# Patient Record
Sex: MALE | Race: WHITE | URBAN - METROPOLITAN AREA
[De-identification: names, ages, dates, MRNs, and addresses within clinical notes are randomized per-mention and may not be internally consistent; named-entity substitution may affect disease eponyms.]

---

## 2017-03-15 ENCOUNTER — OFFICE VISIT (OUTPATIENT)
Dept: FAMILY MEDICINE | Facility: CLINIC | Age: 42
End: 2017-03-15
Payer: COMMERCIAL

## 2017-03-15 VITALS
SYSTOLIC BLOOD PRESSURE: 126 MMHG | OXYGEN SATURATION: 100 % | BODY MASS INDEX: 29.19 KG/M2 | HEIGHT: 72 IN | HEART RATE: 71 BPM | RESPIRATION RATE: 16 BRPM | DIASTOLIC BLOOD PRESSURE: 84 MMHG | TEMPERATURE: 97.5 F | WEIGHT: 215.5 LBS

## 2017-03-15 DIAGNOSIS — R10.12 LEFT UPPER QUADRANT PAIN: Primary | ICD-10-CM

## 2017-03-15 DIAGNOSIS — R19.5 DECREASED STOOL CALIBER: ICD-10-CM

## 2017-03-15 PROCEDURE — 99214 OFFICE O/P EST MOD 30 MIN: CPT | Performed by: FAMILY MEDICINE

## 2017-03-15 NOTE — NURSING NOTE
Chief Complaint   Patient presents with     digestion       Initial /90  Pulse 71  Temp 97.5  F (36.4  C) (Tympanic)  Resp 16  Ht 6' (1.829 m)  Wt 215 lb 8 oz (97.8 kg)  SpO2 100%  BMI 29.23 kg/m2 Estimated body mass index is 29.23 kg/(m^2) as calculated from the following:    Height as of this encounter: 6' (1.829 m).    Weight as of this encounter: 215 lb 8 oz (97.8 kg).  Medication Reconciliation: complete   .Maxi HERNANDEZ

## 2017-03-15 NOTE — PROGRESS NOTES
SUBJECTIVE:                                                    Sabas Broussard is a 41 year old male who presents to clinic today for the following health issues:      digestion      Duration: 2016    Description (location/character/radiation): left  Under rib area, colon    Intensity:  moderate    Accompanying signs and symptoms: none    History (similar episodes/previous evaluation): None    Precipitating or alleviating factors: None    Therapies tried and outcome: None     Started in September.  Got weird pain in back.  Migrated to side where kidney was.  Got bad, came in.  Saw someone, and at the same time was feeling constipated.  At the time there were all sorts of issues going on at same time, and wasn't sure what to make of it.      Also had gallbladder issues a few years back pain was never where it was supposed to be.    Did xrays in September; pretty big kidney stone.    Since then has dull pain in left upper quadrant.  Not now, but comes and goes.  Always in LUQ.    Last part, when goes to the bathroom not necessarily constipated but since then it's like smaller and skinnier.      No blood in stool.  No blood when wipes.  No family history of colon cancer.    Has had a lot of stress in life - wife left in December and dad  in February.    Problem list and histories reviewed & adjusted, as indicated.  Additional history: as documented    Reviewed and updated as needed this visit by clinical staff  Tobacco  Allergies  Med Hx  Surg Hx  Fam Hx  Soc Hx      Reviewed and updated as needed this visit by Provider         ROS:  Constitutional, HEENT, cardiovascular, pulmonary, gi and gu systems are negative, except as otherwise noted.    OBJECTIVE:                                                    /84  Pulse 71  Temp 97.5  F (36.4  C) (Tympanic)  Resp 16  Ht 6' (1.829 m)  Wt 215 lb 8 oz (97.8 kg)  SpO2 100%  BMI 29.23 kg/m2  Body mass index is 29.23 kg/(m^2).  GENERAL: healthy, alert  and no distress  RESP: lungs clear to auscultation - no rales, rhonchi or wheezes  CV: regular rate and rhythm, normal S1 S2, no S3 or S4, no murmur, click or rub, no peripheral edema and peripheral pulses strong  ABDOMEN: soft, nontender, without hepatosplenomegaly or masses, other than tenderness LUQ mild and bowel sounds normal  MS: no gross musculoskeletal defects noted, no edema  SKIN: no suspicious lesions or rashes  NEURO: Normal strength and tone, mentation intact and speech normal  PSYCH: mentation appears normal, affect normal/bright     ASSESSMENT/PLAN:                                                      Sabas was seen today for digestion.    Diagnoses and all orders for this visit:    Left upper quadrant pain  -     CT Abdomen w/o Contrast; Future  -     GASTROENTEROLOGY ADULT REF PROCEDURE ONLY    Decreased stool caliber  -     CT Abdomen w/o Contrast; Future  -     GASTROENTEROLOGY ADULT REF PROCEDURE ONLY        Patient Instructions   I'm glad you came in.    1) For your stomach pain: I think we need to look at 2 things:   --Let's have you get a CT scan to take a closer look at that left upper quadrant.  Call to schedule this:   For [facility] call [phone].  For [place] or [facility] call [phone].     --I'd like you to get a colonoscopy - they will call to schedule this for you.    2)  Consider Dad's separation support group - google this.          Rayne Sosa MD  [facility]

## 2017-03-15 NOTE — PATIENT INSTRUCTIONS
I'm glad you came in.    1) For your stomach pain: I think we need to look at 2 things:   --Let's have you get a CT scan to take a closer look at that left upper quadrant.  Call to schedule this:   For [facility] call [phone].  For [place] or [facility] call [phone].     --I'd like you to get a colonoscopy - they will call to schedule this for you.    2)  Consider Dad's separation support group - google this.

## 2017-03-15 NOTE — MR AVS SNAPSHOT
After Visit Summary   3/15/2017    Sabas Broussard    MRN: [id number]           Patient Information     Date Of Birth          1975        Visit Information        Provider Department      3/15/2017 4:00 PM Rayne Sosa MD [facility]        Today's Diagnoses     Left upper quadrant pain    -  1    Decreased stool caliber          Care Instructions    I'm glad you came in.    1) For your stomach pain: I think we need to look at 2 things:   --Let's have you get a CT scan to take a closer look at that left upper quadrant.  Call to schedule this:   For [facility] call [phone].  For [place] or [facility] call [phone].     --I'd like you to get a colonoscopy - they will call to schedule this for you.    2)  Consider Dad's separation support group - google this.            Follow-ups after your visit        Additional Services     GASTROENTEROLOGY ADULT REF PROCEDURE ONLY       Last Lab Result: Creatinine (mg/dL)       Date                     Value                 03/25/2016               1.10             ----------  Body mass index is 29.23 kg/(m^2).      Patient will be contacted to schedule procedure.     Please be aware that coverage of these services is subject to the terms and limitations of your health insurance plan.  Call member services at your health plan with any benefit or coverage questions.  Any procedures must be performed at a [place] facility OR coordinated by your clinic's referral office.    Please bring the following with you to your appointment:    (1) Any X-Rays, CTs or MRIs which have been performed.  Contact the facility where they were done to arrange for  prior to your scheduled appointment.    (2) List of current medications   (3) This referral request   (4) Any documents/labs given to you for this referral                  Future tests that were ordered for you today     Open Future Orders        Priority  Expected Expires Ordered    CT Abdomen w/o Contrast Routine  3/15/2018 3/15/2017            Who to contact     If you have questions or need follow up information about today's clinic visit or your schedule please contact [facility] directly at [phone].  Normal or non-critical lab and imaging results will be communicated to you by Madvenuehart, letter or phone within 4 business days after the clinic has received the results. If you do not hear from us within 7 days, please contact the clinic through Madvenuehart or phone. If you have a critical or abnormal lab result, we will notify you by phone as soon as possible.  Submit refill requests through [employer] or call your pharmacy and they will forward the refill request to us. Please allow 3 business days for your refill to be completed.          Additional Information About Your Visit        MyChart Information     [employer] gives you secure access to your electronic health record. If you see a primary care provider, you can also send messages to your care team and make appointments. If you have questions, please call your primary care clinic.  If you do not have a primary care provider, please call [phone] and they will assist you.        Care EveryWhere ID     This is your Care EveryWhere ID. This could be used by other organizations to access your [place] medical records  [id number]        Your Vitals Were     Pulse Temperature Respirations Height Pulse Oximetry BMI (Body Mass Index)    71 97.5  F (36.4  C) (Tympanic) 16 6' (1.829 m) 100% 29.23 kg/m2       Blood Pressure from Last 3 Encounters:   03/15/17 128/90   09/21/16 110/84   03/25/16 128/88    Weight from Last 3 Encounters:   03/15/17 215 lb 8 oz (97.8 kg)   09/21/16 212 lb 6.4 oz (96.3 kg)   03/25/16 212 lb 6.4 oz (96.3 kg)              We Performed the Following     GASTROENTEROLOGY ADULT REF PROCEDURE ONLY          Today's Medication Changes          These changes are  accurate as of: 3/15/17  4:40 PM.  If you have any questions, ask your nurse or doctor.               Stop taking these medicines if you haven't already. Please contact your care team if you have questions.     tamsulosin 0.4 MG capsule   Commonly known as:  FLOMAX   Stopped by:  Rayne Sosa MD                    Primary Care Provider Office Phone # Fax #    Rayne Sosa [phone]       [address] [address]        Thank you!     Thank you for choosing [facility]  for your care. Our goal is always to provide you with excellent care. Hearing back from our patients is one way we can continue to improve our services. Please take a few minutes to complete the written survey that you may receive in the mail after your visit with us. Thank you!             Your Updated Medication List - Protect others around you: Learn how to safely use, store and throw away your medicines at www.disposemymeds.org.      Notice  As of 3/15/2017  4:40 PM    You have not been prescribed any medications.

## 2017-07-11 ENCOUNTER — OFFICE VISIT (OUTPATIENT)
Dept: URGENT CARE | Facility: URGENT CARE | Age: 42
End: 2017-07-11
Payer: COMMERCIAL

## 2017-07-11 VITALS
SYSTOLIC BLOOD PRESSURE: 122 MMHG | BODY MASS INDEX: 29.04 KG/M2 | WEIGHT: 214.1 LBS | OXYGEN SATURATION: 99 % | TEMPERATURE: 98.5 F | DIASTOLIC BLOOD PRESSURE: 90 MMHG | HEART RATE: 72 BPM

## 2017-07-11 DIAGNOSIS — L29.9 ITCHING: ICD-10-CM
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
## 2017-07-11 PROCEDURE — 99213 OFFICE O/P EST LOW 20 MIN: CPT | Performed by: PHYSICIAN ASSISTANT

## 2017-07-11 RX ORDER — CETIRIZINE HYDROCHLORIDE 10 MG/1
10 TABLET ORAL EVERY EVENING
Qty: 30 TABLET | Refills: 1 | Status: SHIPPED | OUTPATIENT
Start: 2017-07-11 | End: 2021-10-13

## 2017-07-11 RX ORDER — TRIAMCINOLONE ACETONIDE 5 MG/G
CREAM TOPICAL
Qty: 30 G | Refills: 1 | Status: SHIPPED | OUTPATIENT
Start: 2017-07-11 | End: 2021-10-13

## 2017-07-11 NOTE — PROGRESS NOTES
SUBJECTIVE:  Sabas Broussard is a 42 year old male who presents to the clinic today for a rash.  Onset of rash was few days  ago.   Rash is still present.  Location of the rash: generalized.  Quality/symptoms of rash: itching and rash   Symptoms are mild and rash seems to be stable.  Previous history of a similar rash? none  Recent exposure history: possible bites  Recent new medications: none  Associated symptoms include: itching and rash.    Past Medical History:   Diagnosis Date     Cholecystitis 2011     Concussion age 17     Fracture, clavicle age 17     House dust mite allergy     and mold allergy        Allergies   Allergen Reactions     Seasonal Allergies      Social History   Substance Use Topics     Smoking status: Never Smoker     Smokeless tobacco: Never Used     Alcohol use 0.0 oz/week     0 Standard drinks or equivalent per week      Comment: socially       ROS:  CONSTITUTIONAL:NEGATIVE for fever, chills, change in weight  INTEGUMENTARY/SKIN: POSITIVE for rash, itching  ENT/MOUTH: NEGATIVE for ear, mouth and throat problems  RESP:NEGATIVE for significant cough or SOB  CV: NEGATIVE for chest pain, palpitations or peripheral edema  GI: NEGATIVE for nausea, abdominal pain, heartburn, or change in bowel habits  MUSCULOSKELETAL: NEGATIVE for significant arthralgias or myalgia  NEURO: NEGATIVE for weakness, dizziness or paresthesias    EXAM:   /90  Pulse 72  Temp 98.5  F (36.9  C)  Wt 214 lb 1.6 oz (97.1 kg)  SpO2 99%  BMI 29.04 kg/m2  GENERAL: alert, no acute distress.  SKIN: Rash description:    Distribution: generalized  Location: generalized    Color: itching,  Lesion type: maculopapular, isolated with inflammation  RESP: lungs clear to auscultation - no rales, rhonchi or wheezes  CV: regular rates and rhythm, normal S1 S2, no murmur noted  Extremities: no peripheral edema or tenderness, peripheral pulses normal  MS:  extremities normal- no gross deformities noted, no erythema, FROM noted in  all extremities  NEURO: Normal strength and tone, sensory exam grossly normal,  normal speech and mentation    ASSESSMENT/PLAN:      ICD-10-CM    1. Bug bites, initial encounter W57.XXXA triamcinolone (KENALOG) 0.5 % cream   2. Itching L29.9 cetirizine (ZYRTEC) 10 MG tablet     triamcinolone (KENALOG) 0.5 % cream     1) See today's orders.  2) Follow-up with primary clinic if not improving

## 2017-07-11 NOTE — NURSING NOTE
Chief Complaint   Patient presents with     Derm Problem     Pt c/o an itchy,spotty rash with onset this morning. Pt was camping yesterday. Pt's children have same rash.     Urgent Care       Initial /90  Pulse 72  Temp 98.5  F (36.9  C)  Wt 214 lb 1.6 oz (97.1 kg)  SpO2 99%  BMI 29.04 kg/m2 Estimated body mass index is 29.04 kg/(m^2) as calculated from the following:    Height as of 3/15/17: 6' (1.829 m).    Weight as of this encounter: 214 lb 1.6 oz (97.1 kg).  Medication Reconciliation: complete

## 2017-07-11 NOTE — MR AVS SNAPSHOT
After Visit Summary   7/11/2017    Sabas Broussard    MRN: [id number]           Patient Information     Date Of Birth          1975        Visit Information        Provider Department      7/11/2017 12:35 PM Jay Disla PA-C [facility]        Today's Diagnoses     Bug bites, initial encounter    -  1    Itching           Follow-ups after your visit        Who to contact     If you have questions or need follow up information about today's clinic visit or your schedule please contact [facility] directly at [phone].  Normal or non-critical lab and imaging results will be communicated to you by MyLifehart, letter or phone within 4 business days after the clinic has received the results. If you do not hear from us within 7 days, please contact the clinic through MyLifehart or phone. If you have a critical or abnormal lab result, we will notify you by phone as soon as possible.  Submit refill requests through [employer] or call your pharmacy and they will forward the refill request to us. Please allow 3 business days for your refill to be completed.          Additional Information About Your Visit        MyChart Information     [employer] gives you secure access to your electronic health record. If you see a primary care provider, you can also send messages to your care team and make appointments. If you have questions, please call your primary care clinic.  If you do not have a primary care provider, please call [phone] and they will assist you.        Care EveryWhere ID     This is your Care EveryWhere ID. This could be used by other organizations to access your [place] medical records  [id number]        Your Vitals Were     Pulse Temperature Pulse Oximetry BMI (Body Mass Index)          72 98.5  F (36.9  C) 99% 29.04 kg/m2         Blood Pressure from Last 3 Encounters:   07/11/17 122/90   03/15/17 126/84   09/21/16 110/84    Weight from  Last 3 Encounters:   07/11/17 214 lb 1.6 oz (97.1 kg)   03/15/17 215 lb 8 oz (97.8 kg)   09/21/16 212 lb 6.4 oz (96.3 kg)              Today, you had the following     No orders found for display         Today's Medication Changes          These changes are accurate as of: 7/11/17  1:11 PM.  If you have any questions, ask your nurse or doctor.               Start taking these medicines.        Dose/Directions    cetirizine 10 MG tablet   Commonly known as:  zyrTEC   Used for:  Itching   Started by:  Jay Disla PA-C        Dose:  10 mg   Take 1 tablet (10 mg) by mouth every evening   Quantity:  30 tablet   Refills:  1       triamcinolone 0.5 % cream   Commonly known as:  KENALOG   Used for:  Itching, Bug bites, initial encounter   Started by:  Jay Disla PA-C        Apply sparingly to affected area three times daily.   Quantity:  30 g   Refills:  1            Where to get your medicines      These medications were sent to [facility] [address] [place] [address] [address] [facility] [address] [facility] [address]     Phone:  [phone]     cetirizine 10 MG tablet    triamcinolone 0.5 % cream                Primary Care Provider Office Phone # Fax #    Rayne Joy Sosa [phone]       [address] [address]        Equal Access to Services     KEESHA DAILY AH: Hadii anthony ku hadasho Soomaali, waaxda luqadaha, qaybta kaalmada adeegyada, thang lofton. So [facility] [phone].    ATENCIÓN: Si habla teressa, tiene a beaver disposición servicios gratuitos de asistencia lingüística. Rachel julien [phone].    We comply with applicable federal civil rights laws and [place] laws. We do not discriminate on the basis of race, color, national origin, age, disability sex, sexual orientation or gender identity.            Thank you!     Thank you for choosing [facility]  for your care. Our goal is always to provide you with excellent care. Hearing back from our patients is one way we can continue to improve our services. Please take a few minutes to complete the written survey that you may receive in the mail after your visit with us. Thank you!             Your Updated Medication List - Protect others around you: Learn how to safely use, store and throw away your medicines at www.disposemymeds.org.          This list is accurate as of: 7/11/17  1:11 PM.  Always use your most recent med list.                   Brand Name Dispense Instructions for use Diagnosis    cetirizine 10 MG tablet    zyrTEC    30 tablet    Take 1 tablet (10 mg) by mouth every evening    Itching       triamcinolone 0.5 % cream    KENALOG    30 g    Apply sparingly to affected area three times daily.    Itching, Bug bites, initial encounter

## 2017-10-05 ENCOUNTER — TRANSFERRED RECORDS (OUTPATIENT)
Dept: HEALTH INFORMATION MANAGEMENT | Facility: CLINIC | Age: 42
End: 2017-10-05

## 2018-03-09 ENCOUNTER — OFFICE VISIT (OUTPATIENT)
Dept: FAMILY MEDICINE | Facility: CLINIC | Age: 43
End: 2018-03-09
Payer: COMMERCIAL

## 2018-03-09 VITALS
DIASTOLIC BLOOD PRESSURE: 72 MMHG | TEMPERATURE: 97.6 F | RESPIRATION RATE: 16 BRPM | BODY MASS INDEX: 29.29 KG/M2 | OXYGEN SATURATION: 98 % | SYSTOLIC BLOOD PRESSURE: 126 MMHG | HEART RATE: 66 BPM | WEIGHT: 216 LBS

## 2018-03-09 DIAGNOSIS — R19.5 DECREASED STOOL CALIBER: Primary | ICD-10-CM

## 2018-03-09 PROCEDURE — 99213 OFFICE O/P EST LOW 20 MIN: CPT | Performed by: FAMILY MEDICINE

## 2018-03-09 NOTE — NURSING NOTE
Chief Complaint   Patient presents with     Orders     ct and gastro       Initial /90  Pulse 66  Temp 97.6  F (36.4  C) (Tympanic)  Resp 16  Wt 216 lb (98 kg)  SpO2 98%  BMI 29.29 kg/m2 Estimated body mass index is 29.29 kg/(m^2) as calculated from the following:    Height as of 3/15/17: 6' (1.829 m).    Weight as of this encounter: 216 lb (98 kg).  Medication Reconciliation: complete   .Maxi HERNANDEZ

## 2018-03-09 NOTE — MR AVS SNAPSHOT
After Visit Summary   3/9/2018    Sabas Broussard    MRN: [id number]           Patient Information     Date Of Birth          1975        Visit Information        Provider Department      3/9/2018 10:20 AM Rayne Sosa MD [facility]        Today's Diagnoses     Decreased stool caliber    -  1       Follow-ups after your visit        Additional Services     GASTROENTEROLOGY ADULT REF PROCEDURE ONLY [facility] [phone] (Dr. Pratt)                 Who to contact     If you have questions or need follow up information about today's clinic visit or your schedule please contact [facility] directly at [phone].  Normal or non-critical lab and imaging results will be communicated to you by MyChart, letter or phone within 4 business days after the clinic has received the results. If you do not hear from us within 7 days, please contact the clinic through [employer] or phone. If you have a critical or abnormal lab result, we will notify you by phone as soon as possible.  Submit refill requests through "[employer] or call your pharmacy and they will forward the refill request to us. Please allow 3 business days for your refill to be completed.          Additional Information About Your Visit        MyChart Information     "[employer] gives you secure access to your electronic health record. If you see a primary care provider, you can also send messages to your care team and make appointments. If you have questions, please call your primary care clinic.  If you do not have a primary care provider, please call [phone] and they will assist you.        Care EveryWhere ID     This is your Care EveryWhere ID. This could be used by other organizations to access your [place] medical records  [id number]        Your Vitals Were     Pulse Temperature Respirations Pulse Oximetry BMI (Body Mass Index)       66 97.6  F (36.4  C)  (Tympanic) 16 98% 29.29 kg/m2        Blood Pressure from Last 3 Encounters:   03/09/18 122/90   07/11/17 122/90   03/15/17 126/84    Weight from Last 3 Encounters:   03/09/18 216 lb (98 kg)   07/11/17 214 lb 1.6 oz (97.1 kg)   03/15/17 215 lb 8 oz (97.8 kg)              We Performed the Following     GASTROENTEROLOGY ADULT REF PROCEDURE ONLY [facility] [phone] (Dr. Pratt)        Primary Care Provider Office Phone # Fax #    Rayne Sosa [phone] [address] [facility] [address]        Equal Access to Services     KEESHA DAILY : Hadii anthony tuckero Sogurinder, waaxda luqadaha, qaybta kaalmada adeegyada, thang lofton. So [facility] [phone].    ATENCIÓN: Si habla español, tiene a beaver disposición servicios gratuitos de asistencia lingüística. Llame al [phone].    We comply with applicable federal civil rights laws and [place] laws. We do not discriminate on the basis of race, color, national origin, age, disability, sex, sexual orientation, or gender identity.            Thank you!     Thank you for choosing [facility]  for your care. Our goal is always to provide you with excellent care. Hearing back from our patients is one way we can continue to improve our services. Please take a few minutes to complete the written survey that you may receive in the mail after your visit with us. Thank you!             Your Updated Medication List - Protect others around you: Learn how to safely use, store and throw away your medicines at www.disposemymeds.org.          This list is accurate as of 3/9/18 10:49 AM.  Always use your most recent med list.                   Brand Name Dispense Instructions for use Diagnosis    cetirizine 10 MG tablet    zyrTEC    30 tablet    Take 1 tablet (10 mg) by mouth every evening    Itching       triamcinolone 0.5 % cream    KENALOG    30 g    Apply sparingly to affected area  three times daily.    Itching, Bug bites, initial encounter

## 2018-03-09 NOTE — PROGRESS NOTES
"  SUBJECTIVE:   Sabas Broussard is a 42 year old male who presents to clinic today for the following health issues:      Pt is here to update his referrals from last year ct and gastro never completed.      digestion      Duration: September 2016    Description (location/character/radiation): left  Under rib area, colon    Intensity:  moderate    Accompanying signs and symptoms: none    History (similar episodes/previous evaluation): None    Precipitating or alleviating factors: None    Therapies tried and outcome: None       42 year old male who has had some digestion changes since 9/2016, who I saw last year for this but never followed through with getting tests.  Since that time the pain in his stomach is gone, but he still notes significantly decreased caliber of stool.  He works with cancer (works for [employer], so feels that \"he knows it can happen\".    No pain, but still decreased caliber..      No blood in stool.  No blood when wipes.  No family history of colon cancer.    Problem list and histories reviewed & adjusted, as indicated.  Additional history: as documented    Reviewed and updated as needed this visit by clinical staff  Tobacco  Allergies  Meds  Med Hx  Surg Hx  Fam Hx  Soc Hx      Reviewed and updated as needed this visit by Provider         ROS:  Constitutional, HEENT, cardiovascular, pulmonary, gi and gu systems are negative, except as otherwise noted.    OBJECTIVE:                                                    /90  Pulse 66  Temp 97.6  F (36.4  C) (Tympanic)  Resp 16  Wt 216 lb (98 kg)  SpO2 98%  BMI 29.29 kg/m2  Body mass index is 29.29 kg/(m^2).  GENERAL: healthy, alert and no distress  RESP: lungs clear to auscultation - no rales, rhonchi or wheezes  CV: regular rate and rhythm, normal S1 S2, no S3 or S4, no murmur, click or rub, no peripheral edema and peripheral pulses strong  ABDOMEN: soft, nontender, without hepatosplenomegaly or masses, other than " tenderness LUQ mild and bowel sounds normal  MS: no gross musculoskeletal defects noted, no edema  SKIN: no suspicious lesions or rashes  NEURO: Normal strength and tone, mentation intact and speech normal  PSYCH: mentation appears normal, affect normal/bright     ASSESSMENT/PLAN:                                                      Sabas was seen today for orders.    Diagnoses and all orders for this visit:    Decreased stool caliber  -     GASTROENTEROLOGY ADULT REF PROCEDURE ONLY [facility] [phone] (Dr. Pratt)    --As pain resolved I don't think needs CT scan.  --However, with persistently significantly reduced stool caliber does need colonoscopy; patient agrees, will call to schedule this himself this time instead of waiting for call.  --Glad things are better with stress level and home, boys.  Let us know if we can help or other symptoms or concerns.    Rayne Sosa MD  [facility]

## 2019-09-30 ENCOUNTER — TELEPHONE (OUTPATIENT)
Dept: FAMILY MEDICINE | Facility: CLINIC | Age: 44
End: 2019-09-30

## 2019-09-30 DIAGNOSIS — R19.5 DECREASED STOOL CALIBER: Primary | ICD-10-CM

## 2019-09-30 NOTE — TELEPHONE ENCOUNTER
Requesting referral from PCP.     Last OV: 3/9/2018 when referral was first signed.     Can this order be update for this patient so he is able to use or does he need an OV to assess further at this time.

## 2019-09-30 NOTE — TELEPHONE ENCOUNTER
Reason for Call: Request for an order or referral:    Order or referral being requested: Referral for GI    Date needed: as soon as possible    Has the patient been seen by the PCP for this problem? YES    Additional comments: Patient saw Dr Sosa and got a referral for GI. Patient states he waited too long and now it is . Patient would like another referral for GI. Please call if any questions and also to let patient know when referral is sent    Phone number Patient can be reached at:  Cell number on file:    Telephone Information:   Mobile [phone]       Best Time:  any    Can we leave a detailed message on this number?  YES    Call taken on 2019 at 2:21 PM by AMARILYS FRAUSTO

## 2019-10-01 NOTE — TELEPHONE ENCOUNTER
Patient Contact    Attempt # 1    Was call answered?  No.  Left message on voicemail with information to call me back.    GI Referral placed by PCP:   [facility] [phone] Dr. Pratt

## 2019-10-01 NOTE — TELEPHONE ENCOUNTER
Done.  Can you let patient know? Thanks.  Dr. Rayne Sosa MD/[facility]

## 2019-10-03 NOTE — TELEPHONE ENCOUNTER
Left a detailed voice message informing pt PCP has placed a referral for him. Referral information will be sent via my chart.

## 2019-10-09 ENCOUNTER — TELEPHONE (OUTPATIENT)
Dept: GASTROENTEROLOGY | Facility: CLINIC | Age: 44
End: 2019-10-09

## 2019-10-09 DIAGNOSIS — Z12.11 SPECIAL SCREENING FOR MALIGNANT NEOPLASMS, COLON: Primary | ICD-10-CM

## 2019-10-09 RX ORDER — PEG-3350, SODIUM SULFATE, SODIUM CHLORIDE, POTASSIUM CHLORIDE, SODIUM ASCORBATE AND ASCORBIC ACID 7.5-2.691G
1 KIT ORAL ONCE
Qty: 1 EACH | Refills: 0 | Status: SHIPPED | OUTPATIENT
Start: 2019-10-09 | End: 2019-10-09

## 2019-10-09 NOTE — TELEPHONE ENCOUNTER
Patient Name: Sabas Broussard   : 1975  MRN: [id number]       : [x] N/A           Additional Information regarding appointment:      Patient scheduled for:   [x] Colonoscopy     Indication for procedure.   [x] Decreased stool caliber [R19.5]  - Primary    Sedation Type: [x] Conscious Sedation       Procedure Provider:  Dr. Castro      Referring Provider. Rayne Sosa    Arrival time verified: 12:15 pm / Tues / 10/15/19    Facility location verified:       [x][address], 5th floor       Prep Type:   [x]Golytely eRx:     Anticoagulants or blood thinners: [x]None     Electronic implanted devices: [x] No      H&P / Pre op physical completed: [x] N/A,     Additional Information:      _______________________________________________      Instructions given: [] Rec'd & Read   [] Reviewed       [] Resent via [facility] - This includes:    instructions, Conscious Sedation policy /  MAC Instructions, procedure date/time/location/provider.       [] Resent via eMail - eMail address confirmed: Pt requests resend endoscopy appointment information communication via unencrypted e-mail. This includes    instructions,   Map, Conscious Sedation / MAC policy, procedure date/time/location/provider.  Pt acknowledges that they have agreed to accept the risks and receive unencrypted communications for this incidence.        Pre procedure teaching completed: [] Yes - Reviewed, [] No,      [] No questions regarding Sedation as ordered, []      Transportation from procedure & responsible adult to be with patient following procedure for a minimum of 6 hrs (Conscious Sedation) 24 hrs (MAC): [] Yes   - confirmed will have post-procedure companionship as required, [] Pending  , [] No         Digna Peña RN, [facility] End[facility]

## 2019-10-15 ENCOUNTER — HOSPITAL ENCOUNTER (OUTPATIENT)
Facility: AMBULATORY SURGERY CENTER | Age: 44
End: 2019-10-15
Attending: INTERNAL MEDICINE
Payer: COMMERCIAL

## 2019-10-15 VITALS
BODY MASS INDEX: 28.44 KG/M2 | TEMPERATURE: 97.9 F | HEART RATE: 66 BPM | OXYGEN SATURATION: 97 % | RESPIRATION RATE: 18 BRPM | SYSTOLIC BLOOD PRESSURE: 134 MMHG | DIASTOLIC BLOOD PRESSURE: 101 MMHG | WEIGHT: 210 LBS | HEIGHT: 72 IN

## 2019-10-15 LAB — COLONOSCOPY: NORMAL

## 2019-10-15 RX ORDER — ONDANSETRON 2 MG/ML
4 INJECTION INTRAMUSCULAR; INTRAVENOUS
Status: DISCONTINUED | OUTPATIENT
Start: 2019-10-15 | End: 2019-10-16 | Stop reason: HOSPADM

## 2019-10-15 RX ORDER — LIDOCAINE 40 MG/G
CREAM TOPICAL
Status: DISCONTINUED | OUTPATIENT
Start: 2019-10-15 | End: 2019-10-16 | Stop reason: HOSPADM

## 2019-10-15 RX ORDER — SIMETHICONE
LIQUID (ML) MISCELLANEOUS PRN
Status: DISCONTINUED | OUTPATIENT
Start: 2019-10-15 | End: 2019-10-15 | Stop reason: HOSPADM

## 2019-10-15 ASSESSMENT — MIFFLIN-ST. JEOR: SCORE: 1885.55

## 2020-03-01 ENCOUNTER — HEALTH MAINTENANCE LETTER (OUTPATIENT)
Age: 45
End: 2020-03-01

## 2020-06-16 ENCOUNTER — VIRTUAL VISIT (OUTPATIENT)
Dept: FAMILY MEDICINE | Facility: OTHER | Age: 45
End: 2020-06-16

## 2020-06-16 NOTE — PROGRESS NOTES
"Date: 2020 12:53:05  Clinician: Jai Jean  Clinician NPI: [id number]  Patient: Sabas Broussard  Patient : 1975  Patient Address: [address] [place] [address]  Patient Phone: [phone]  Visit Protocol: Eye conditions  Patient Summary:  Sabas is a 44 year old (: 1975 ) male who initiated a Visit for conjunctivitis.  When asked the question \"Please sign me up to receive news, health information and promotions. \", Sabas responded \"No\".    Images of his eye condition were uploaded.   His symptoms started 1-2 days ago and affect the left eye. The symptoms consist of eyelid swelling, itchy eye(s), and drainage coming from the eye(s).   Symptom details     Drainage: The color of the drainage coming out of his eye(s) is yellow. The drainage is thick but does not cause his eyelids to be stuck shut in the morning.    Itchiness: Sabas has seasonal allergies or hay fever.     Denied symptoms include light sensitivity, eye pain, bumps on the eyelid, and eye redness. Sabas does not have subconjunctival hemorrhage and has not experienced a decrease in vision. He does not feel feverish.   Precipitating events   He has not had a recent cold or ear infection, eye surgery, foreign body in the eye(s), and eye injury. He does not wear contact lenses.   Pertinent medical history  Sabas has not ever been diagnosed with glaucoma.   Sabas has been using Ketotifen ophthalmic solution to treat his current symptoms.   Medication efficacy as reported by the patient (free text): Alaway works well for seasonal allergies, which makes my eye puffy and irritated; this is different, my upper eyelid is red and swollen and the area above my eyelid is also swollen.  My eye is relatively white and clear.   Sabas does not require proof of evaluation of his eye condition before returning to school, work, or .   Sabas does not smoke or use smokeless tobacco.     MEDICATIONS: No current " medications, ALLERGIES: NKDA  Clinician Response:  Dear Sabas,  Based on the information provided, the bump on your eyelid is most likely a stye (hordeolum). A stye is a red, painful bump that forms when glands on the edge of the eyelid become infected or inflamed.  Medication information  I am prescribing:  Erythromycin 5 mg/gram (0.5%) ophthalmic (eye) ointment. Apply 1 cm ribbon into the affected eye(s) 4-6 times per day for 7 days. There are no refills with this prescription.  The medication I prescribed is an antibiotic medication. Infections can be caused by either bacteria or a virus, and often have similar symptoms, so it is possible that this is a viral infection. Antibiotics are only effective against bacterial infections, so when it is caused by a virus, the medication will not help symptoms improve or make it less contagious.  Self care  I recommend wetting a clean washcloth with warm water and gently placing it on the bump/swollen area of your eye for 10-15 minutes. Reheat the washcloth with warm water when it cools. Doing this 3-4 times a day will ease soreness and help the stye to drain. Do not squeeze or try to pop your stye because this could cause the infection to spread.  To reduce the spread of the eye infection, be sure to wash your hands at least once per hour and avoid touching the eyes as much as possible.  The following will reduce the risk for future eye infections:     Frequent handwashing    Replace towels and washcloths daily    Do not share towels and washcloths with others     Steps you can take to be as comfortable as possible:     Take regular breaks and remember to blink regularly when reading or using a computer for long periods of time    Wear sunglasses when outside    Wear eye protection when swimming or working with chemicals    Use good lighting     When to seek care  Please make an appointment to be seen in a clinic or urgent care if any of the following occurs:     You  develop new symptoms or your symptoms becomes worse.    Your symptoms do not improve within 2 days of starting treatment.      Diagnosis: Stye (hordeolum externum)  Diagnosis ICD: H00.019  Prescription: erythromycin 5 mg/gram (0.5 %) ophthalmic (eye) ointment 1 3.5 gram tube, 7 days supply. Apply 1 cm ribbon into the affected eye(s) 4-6 times per day for 7 days. Refills: 0, Refill as needed: no, Allow substitutions: yes  Pharmacy: [id number] - [phone] - [address] [place] [address]

## 2020-12-14 ENCOUNTER — HEALTH MAINTENANCE LETTER (OUTPATIENT)
Age: 45
End: 2020-12-14

## 2021-04-18 ENCOUNTER — HEALTH MAINTENANCE LETTER (OUTPATIENT)
Age: 46
End: 2021-04-18

## 2021-10-02 ENCOUNTER — HEALTH MAINTENANCE LETTER (OUTPATIENT)
Age: 46
End: 2021-10-02

## 2021-10-13 ENCOUNTER — OFFICE VISIT (OUTPATIENT)
Dept: FAMILY MEDICINE | Facility: CLINIC | Age: 46
End: 2021-10-13
Payer: COMMERCIAL

## 2021-10-13 VITALS
TEMPERATURE: 97.3 F | OXYGEN SATURATION: 98 % | DIASTOLIC BLOOD PRESSURE: 78 MMHG | SYSTOLIC BLOOD PRESSURE: 128 MMHG | HEART RATE: 71 BPM | BODY MASS INDEX: 27.94 KG/M2 | WEIGHT: 206.04 LBS

## 2021-10-13 DIAGNOSIS — Z13.220 SCREENING FOR HYPERLIPIDEMIA: ICD-10-CM

## 2021-10-13 DIAGNOSIS — Z00.00 ROUTINE GENERAL MEDICAL EXAMINATION AT A HEALTH CARE FACILITY: Primary | ICD-10-CM

## 2021-10-13 DIAGNOSIS — Z11.4 SCREENING FOR HIV (HUMAN IMMUNODEFICIENCY VIRUS): ICD-10-CM

## 2021-10-13 DIAGNOSIS — Z11.59 NEED FOR HEPATITIS C SCREENING TEST: ICD-10-CM

## 2021-10-13 LAB
CHOLEST SERPL-MCNC: 238 MG/DL
FASTING STATUS PATIENT QL REPORTED: YES
FASTING STATUS PATIENT QL REPORTED: YES
GLUCOSE BLD-MCNC: 73 MG/DL (ref 70–99)
HCV AB SERPL QL IA: NONREACTIVE
HDLC SERPL-MCNC: 49 MG/DL
HIV 1+2 AB+HIV1 P24 AG SERPL QL IA: NONREACTIVE
LDLC SERPL CALC-MCNC: 154 MG/DL
NONHDLC SERPL-MCNC: 189 MG/DL
TRIGL SERPL-MCNC: 173 MG/DL

## 2021-10-13 PROCEDURE — 87389 HIV-1 AG W/HIV-1&-2 AB AG IA: CPT | Performed by: FAMILY MEDICINE

## 2021-10-13 PROCEDURE — 82947 ASSAY GLUCOSE BLOOD QUANT: CPT | Performed by: FAMILY MEDICINE

## 2021-10-13 PROCEDURE — 80061 LIPID PANEL: CPT | Performed by: FAMILY MEDICINE

## 2021-10-13 PROCEDURE — 36415 COLL VENOUS BLD VENIPUNCTURE: CPT | Performed by: FAMILY MEDICINE

## 2021-10-13 PROCEDURE — 99386 PREV VISIT NEW AGE 40-64: CPT | Mod: 25 | Performed by: FAMILY MEDICINE

## 2021-10-13 PROCEDURE — 90471 IMMUNIZATION ADMIN: CPT | Performed by: FAMILY MEDICINE

## 2021-10-13 PROCEDURE — 82784 ASSAY IGA/IGD/IGG/IGM EACH: CPT | Performed by: FAMILY MEDICINE

## 2021-10-13 PROCEDURE — 86803 HEPATITIS C AB TEST: CPT | Performed by: FAMILY MEDICINE

## 2021-10-13 PROCEDURE — 90686 IIV4 VACC NO PRSV 0.5 ML IM: CPT | Performed by: FAMILY MEDICINE

## 2021-10-13 PROCEDURE — 83516 IMMUNOASSAY NONANTIBODY: CPT | Performed by: FAMILY MEDICINE

## 2021-10-13 RX ORDER — LORATADINE 10 MG/1
TABLET ORAL
COMMUNITY
Start: 2020-01-01

## 2021-10-13 ASSESSMENT — ENCOUNTER SYMPTOMS
FREQUENCY: 0
HEARTBURN: 0
PALPITATIONS: 0
CHILLS: 0
DIARRHEA: 0
NERVOUS/ANXIOUS: 0
MYALGIAS: 0
DIZZINESS: 0
ARTHRALGIAS: 0
EYE PAIN: 0
DYSURIA: 0
SORE THROAT: 0
NAUSEA: 0
HEADACHES: 0
COUGH: 0
CONSTIPATION: 0
PARESTHESIAS: 0
SHORTNESS OF BREATH: 0
HEMATURIA: 0
JOINT SWELLING: 0
WEAKNESS: 0
FEVER: 0
ABDOMINAL PAIN: 0
HEMATOCHEZIA: 0

## 2021-10-13 NOTE — PROGRESS NOTES
SUBJECTIVE:   CC: Sabas Broussard is an 46 year old male who presents for preventative health visit.     Patient has been advised of split billing requirements and indicates understanding: Yes  Healthy Habits:     Getting at least 3 servings of Calcium per day:  Yes    Bi-annual eye exam:  Yes    Dental care twice a year:  Yes    Sleep apnea or symptoms of sleep apnea:  None    Diet:  Regular (no restrictions)    Frequency of exercise:  4-5 days/week    Duration of exercise:  45-60 minutes    Taking medications regularly:  Yes    Medication side effects:  None    PHQ-2 Total Score: 0    Additional concerns today:  No    Gut issues.  Had colonoscopy - normal.  Poop different smaller caliber.  More constipated.  Normal colonoscopy 10/2019.  Son diagnosed with type 1 dm, and celiac?  Could that be it?        BP.  123/81.  Today       Today's PHQ-2 Score:   PHQ-2 ( 1999 Pfizer) 10/13/2021   Q1: Little interest or pleasure in doing things 0   Q2: Feeling down, depressed or hopeless 0   PHQ-2 Score 0   Q1: Little interest or pleasure in doing things Not at all   Q2: Feeling down, depressed or hopeless Not at all   PHQ-2 Score 0       Abuse: Current or Past(Physical, Sexual or Emotional)- No  Do you feel safe in your environment? Yes    Have you ever done Advance Care Planning? (For example, a Health Directive, POLST, or a discussion with a medical provider or your loved ones about your wishes): No, advance care planning information given to patient to review.  Patient plans to discuss their wishes with loved ones or provider.      Social History     Tobacco Use     Smoking status: Never Smoker     Smokeless tobacco: Never Used   Substance Use Topics     Alcohol use: Yes     Alcohol/week: 0.0 standard drinks     Comment: socially     If you drink alcohol do you typically have >3 drinks per day or >7 drinks per week? No    Alcohol Use 10/13/2021   Prescreen: >3 drinks/day or >7 drinks/week? No   Prescreen: >3 drinks/day or  >7 drinks/week? -   No flowsheet data found.    Last PSA: No results found for: PSA    Reviewed orders with patient. Reviewed health maintenance and updated orders accordingly - Yes      Reviewed and updated as needed this visit by clinical staff  Tobacco  Allergies  Meds  Problems             Reviewed and updated as needed this visit by Provider  Tobacco    Problems                Review of Systems   Constitutional: Negative for chills and fever.   HENT: Negative for congestion, ear pain, hearing loss and sore throat.    Eyes: Negative for pain and visual disturbance.   Respiratory: Negative for cough and shortness of breath.    Cardiovascular: Negative for chest pain, palpitations and peripheral edema.   Gastrointestinal: Negative for abdominal pain, constipation, diarrhea, heartburn, hematochezia and nausea.   Genitourinary: Negative for discharge, dysuria, frequency, genital sores, hematuria, impotence and urgency.   Musculoskeletal: Negative for arthralgias, joint swelling and myalgias.   Skin: Negative for rash.   Neurological: Negative for dizziness, weakness, headaches and paresthesias.   Psychiatric/Behavioral: Negative for mood changes. The patient is not nervous/anxious.          OBJECTIVE:   /78   Pulse 71   Temp 97.3  F (36.3  C) (Temporal)   Wt 93.5 kg (206 lb 0.6 oz)   SpO2 98%   BMI 27.94 kg/m      Physical Exam  GENERAL: healthy, alert and no distress  EYES: Eyes grossly normal to inspection, PERRL and conjunctivae and sclerae normal  HENT: ear canals and TM's normal, nose and mouth without ulcers or lesions  NECK: no adenopathy, no asymmetry, masses, or scars and thyroid normal to palpation  RESP: lungs clear to auscultation - no rales, rhonchi or wheezes  CV: regular rate and rhythm, normal S1 S2, no S3 or S4, no murmur, click or rub, no peripheral edema and peripheral pulses strong  ABDOMEN: soft, nontender, no hepatosplenomegaly, no masses and bowel sounds normal  MS: no gross  musculoskeletal defects noted, no edema  SKIN: no suspicious lesions or rashes  NEURO: Normal strength and tone, mentation intact and speech normal  PSYCH: mentation appears normal, affect normal/bright    Diagnostic Test Results:  Labs reviewed in Epic    ASSESSMENT/PLAN:   Sabas was seen today for physical.    Diagnoses and all orders for this visit:    Routine general medical examination at a health care facility  Comments:  BP good this year  Check lipids.  Check TTG to complete rule out celiac  Orders:  -     HIV Antigen Antibody Combo; Future  -     Lipid panel reflex to direct LDL Fasting; Future  -     GLUCOSE; Future  -     Tissue transglutaminase stephen IgA and IgG; Future  -     IgA; Future  -     Adult Urology Referral; Future  -     Adult Dermatology Referral; Future  -     HIV Antigen Antibody Combo  -     Lipid panel reflex to direct LDL Fasting  -     GLUCOSE  -     Tissue transglutaminase stephen IgA and IgG  -     IgA    Screening for HIV (human immunodeficiency virus)  Comments:  low risk, routine  ordered    Need for hepatitis C screening test  Comments:  low risk, routine  ordered  Orders:  -     Hepatitis C Screen Reflex to HCV RNA Quant and Genotype; Future  -     Hepatitis C Screen Reflex to HCV RNA Quant and Genotype    Screening for hyperlipidemia    Other orders  -     REVIEW OF HEALTH MAINTENANCE PROTOCOL ORDERS  -     INFLUENZA VACCINE IM >6 MO VALENT IIV4 (ALFURIA/FLUZONE)        Patient has been advised of split billing requirements and indicates understanding: Yes  COUNSELING:   Reviewed preventive health counseling, as reflected in patient instructions    Estimated body mass index is 27.94 kg/m  as calculated from the following:    Height as of 10/15/19: 1.829 m (6').    Weight as of this encounter: 93.5 kg (206 lb 0.6 oz).     Weight management plan: Discussed healthy diet and exercise guidelines    He reports that he has never smoked. He has never used smokeless tobacco.      Counseling  Resources:  ATP IV Guidelines  Pooled Cohorts Equation Calculator  FRAX Risk Assessment  ICSI Preventive Guidelines  Dietary Guidelines for Americans, 2010  USDA's MyPlate  ASA Prophylaxis  Lung CA Screening    Rayne Sosa MD  [facility]

## 2021-10-14 LAB
IGA SERPL-MCNC: 310 MG/DL (ref 84–499)
TTG IGA SER-ACNC: 0.4 U/ML
TTG IGG SER-ACNC: 0.8 U/ML

## 2021-10-18 PROBLEM — E78.2 MODERATE MIXED HYPERLIPIDEMIA NOT REQUIRING STATIN THERAPY: Status: ACTIVE | Noted: 2021-10-18

## 2021-10-18 NOTE — RESULT ENCOUNTER NOTE
Dillan Obregon,  It was nice to see you in clinic! Your results are back and show:  -No celiac antibodies making it very unlikely that you have celiac disease.  -Normal glucose and infectious disease testing.  -Your cholesterol is borderline high, but your overall heart disease risk score is okay.  Continue exercise and heart healthy Mediterranean-style diet rich in fish, olive oil, whole grains, vegetables and low in animal fats and processed foods to reduce your risk of heart disease.  Let me know if you have any questions.  Best,  Dr. Rayne Sosa MD/[facility]         The 10-year ASCVD risk score (Mei DAHL Jr., et al., 2013) is: 2.8%    Values used to calculate the score:      Age: 46 years      Sex: Male      Is Non- : No      Diabetic: No      Tobacco smoker: No      Systolic Blood Pressure: 128 mmHg      Is BP treated: No      HDL Cholesterol: 49 mg/dL      Total Cholesterol: 238 mg/dL

## 2022-02-28 ENCOUNTER — OFFICE VISIT (OUTPATIENT)
Dept: DERMATOLOGY | Facility: CLINIC | Age: 47
End: 2022-02-28
Attending: FAMILY MEDICINE
Payer: COMMERCIAL

## 2022-02-28 DIAGNOSIS — D18.01 CHERRY ANGIOMA: Primary | ICD-10-CM

## 2022-02-28 DIAGNOSIS — Z12.83 SKIN CANCER SCREENING: ICD-10-CM

## 2022-02-28 DIAGNOSIS — Z00.00 ROUTINE GENERAL MEDICAL EXAMINATION AT A HEALTH CARE FACILITY: ICD-10-CM

## 2022-02-28 DIAGNOSIS — L81.4 SOLAR LENTIGINOSIS: ICD-10-CM

## 2022-02-28 DIAGNOSIS — D22.9 MULTIPLE PIGMENTED NEVI: ICD-10-CM

## 2022-02-28 PROCEDURE — 99203 OFFICE O/P NEW LOW 30 MIN: CPT | Performed by: PHYSICIAN ASSISTANT

## 2022-02-28 ASSESSMENT — PAIN SCALES - GENERAL: PAINLEVEL: NO PAIN (0)

## 2022-02-28 NOTE — NURSING NOTE
Dermatology Rooming Note    Sabas Broussard's goals for this visit include:   Chief Complaint   Patient presents with     Skin Check     Sabas is here today for his first full body skin check, reports no concerns.     Evette Sidhu, EMT

## 2022-02-28 NOTE — PROGRESS NOTES
[facility] Dermatology Note  Encounter Date: Feb 28, 2022  {kkvisit3:242541}    Dermatology Problem List:  1. ***    ____________________________________________    Assessment & Plan:    # {Diagnosesderm:600790}.   {kkplans:082015}   - ***     # {Diagnosesderm:758113}.   {kkplans:977104}   - ***     Procedures Performed:   {kkprocedurenotes:761118}  {kkprocedurenotes:791253}    Follow-up: {kkfollowup:386234}    Staff and Scribe:     Scribe Disclosure:  IGabby, am serving as a scribe to document services personally performed by Dona Ugarte PA-C based on data collection and the provider's statements to me.     ***  ____________________________________________    CC: No chief complaint on file.    HPI:  Mr. Sabas Broussard is a(n) 46 year old male who presents today {kknew/return:059597} for ***    Patient is otherwise feeling well, without additional skin concerns.    Labs Reviewed:  ***N/A    Physical Exam:  Vitals: There were no vitals taken for this visit.  SKIN: {kkSkinExam:664765}  - ***  - {Skin Exam Derm:462554}.   - {Skin Exam Derm:513011}.   - {Skin Exam Derm:030150}.   - No other lesions of concern on areas examined.     Medications:  Current Outpatient Medications   Medication     loratadine (CLARITIN) 10 MG tablet     No current facility-administered medications for this visit.      Past Medical History:   Patient Active Problem List   Diagnosis     Cholecystitis     House dust mite allergy     Decreased stool caliber     Moderate mixed hyperlipidemia not requiring statin therapy     Past Medical History:   Diagnosis Date     Cholecystitis 2011     Concussion age 17     Fracture, clavicle age 17     House dust mite allergy     and mold allergy     Hypertension 2012    slightly higher than normal but not treated        CC Rayne Sosa MD  [address] [place] [address] on close of this encounter.

## 2022-02-28 NOTE — PROGRESS NOTES
[facility] Dermatology Note  Encounter Date: Feb 28, 2022  Office Visit     Dermatology Problem List:  1. Benign skin cancer screening 2/28/22    ____________________________________________    Assessment & Plan:     # Multiple benign nevi and solar lentigenes  - ABCDEs: Counseled ABCDEs of melanoma: Asymmetry, Border (irregularity), Color (not uniform, changes in color), Diameter (greater than 6 mm which is about the size of a pencil eraser), and Evolving (any changes in preexisting moles).  - Sun protection: Counseled SPF30+ sunscreen, UPF clothing, sun avoidance, tanning bed avoidance.       # Cherry angiomas.   - Benign, No further management at this time.    Procedures Performed:   None  None    Follow-up: 2 year(s) in-person, or earlier for new or changing lesions    Staff:     All risks, benefits and alternatives were discussed with patient.  Patient is in agreement and understands the assessment and plan.  All questions were answered.  Sun Screen Education was given.   Return to Clinic in 2-3 years or sooner as needed.   Dona Ugarte PA-C   ____________________________________________    CC: Skin Check (Sabas is here today for his first full body skin check, reports no concerns.)    HPI:  Mr. Sabas Broussard is a(n) 46 year old male who presents today as a new patient for a skin check. He was recommended a baseline skin check. He has spent a lot of time outdoors, in the sun. He does not always wear sunscreen or other form of sun protection. 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 [place] Denies any significant history of sunburns. No known family history of skin cancer.     Patient is otherwise feeling well, without additional skin concerns.     Labs Reviewed:  N/A    Physical Exam:  Vitals: There were no vitals taken for this visit.  SKIN: Full skin, which includes the head/face, both arms, chest, back,  abdomen,both legs, genitalia and/or groin buttocks, digits and/or nails, was examined.  Foy skin type II-III.   - There are dome shaped bright red papules on the trunk.   Scattered brown macules on sun exposed areas..   - Multiple regular brown pigmented macules and papules are identified on the trunk and extremities, less than 100. .   - No other lesions of concern on areas examined.     Medications:  Current Outpatient Medications   Medication     loratadine (CLARITIN) 10 MG tablet     No current facility-administered medications for this visit.      Past Medical History:   Patient Active Problem List   Diagnosis     Cholecystitis     House dust mite allergy     Decreased stool caliber     Moderate mixed hyperlipidemia not requiring statin therapy     Past Medical History:   Diagnosis Date     Cholecystitis 2011     Concussion age 17     Fracture, clavicle age 17     House dust mite allergy     and mold allergy     Hypertension 2012    slightly higher than normal but not treated       CC Rayne Sosa MD  [address] [place] [address] on close of this encounter.

## 2022-02-28 NOTE — LETTER
2/28/2022       RE: Sabas Broussard  [address]  [facility] [address]     Dear Colleague,    Thank you for referring your patient, Sabas Broussard, to the [facility] DERMATOLOGY CLINIC [place] at [facility]. Please see a copy of my visit note below.    [facility] Dermatology Note  Encounter Date: Feb 28, 2022  Office Visit     Dermatology Problem List:  1. Benign skin cancer screening 2/28/22    ____________________________________________    Assessment & Plan:     # Multiple benign nevi and solar lentigenes  - ABCDEs: Counseled ABCDEs of melanoma: Asymmetry, Border (irregularity), Color (not uniform, changes in color), Diameter (greater than 6 mm which is about the size of a pencil eraser), and Evolving (any changes in preexisting moles).  - Sun protection: Counseled SPF30+ sunscreen, UPF clothing, sun avoidance, tanning bed avoidance.       # Cherry angiomas.   - Benign, No further management at this time.    Procedures Performed:   None  None    Follow-up: 2 year(s) in-person, or earlier for new or changing lesions    Staff:     All risks, benefits and alternatives were discussed with patient.  Patient is in agreement and understands the assessment and plan.  All questions were answered.  Sun Screen Education was given.   Return to Clinic in 2-3 years or sooner as needed.   Dona Ugarte PA-C   ____________________________________________    CC: Skin Check (Sabas is here today for his first full body skin check, reports no concerns.)    HPI:  Mr. Sabas Broussard is a(n) 46 year old male who presents today as a new patient for a skin check. He was recommended a baseline skin check. He has spent a lot of time outdoors, in the sun. He does not always wear sunscreen or other form of sun protection. 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 [place]  any significant history of sunburns. No known family history of skin cancer.     Patient is otherwise feeling well, without additional skin concerns.     Labs Reviewed:  N/A    Physical Exam:  Vitals: There were no vitals taken for this visit.  SKIN: Full skin, which includes the head/face, both arms, chest, back, abdomen,both legs, genitalia and/or groin buttocks, digits and/or nails, was examined.  Foy skin type II-III.   - There are dome shaped bright red papules on the trunk.   Scattered brown macules on sun exposed areas..   - Multiple regular brown pigmented macules and papules are identified on the trunk and extremities, less than 100. .   - No other lesions of concern on areas examined.     Medications:  Current Outpatient Medications   Medication     loratadine (CLARITIN) 10 MG tablet     No current facility-administered medications for this visit.      Past Medical History:   Patient Active Problem List   Diagnosis     Cholecystitis     House dust mite allergy     Decreased stool caliber     Moderate mixed hyperlipidemia not requiring statin therapy     Past Medical History:   Diagnosis Date     Cholecystitis 2011     Concussion age 17     Fracture, clavicle age 17     House dust mite allergy     and mold allergy     Hypertension 2012    slightly higher than normal but not treated       CC Rayne Sosa MD  [address] [place] [address] on close of this encounter.

## 2022-02-28 NOTE — PATIENT INSTRUCTIONS
Patient Education     Checking for Skin Cancer  You can find cancer early by checking your skin each month. There are 3 kinds of skin cancer. They are melanoma, basal cell carcinoma, and squamous cell carcinoma. Doing monthly skin checks is the best way to find new marks or skin changes. Follow the instructions below for checking your skin.   The ABCDEs of checking moles for melanoma   Check your moles or growths for signs of melanoma using ABCDE:     Asymmetry: the sides of the mole or growth don t match    Border: the edges are ragged, notched, or blurred    Color: the color within the mole or growth varies    Diameter: the mole or growth is larger than 6 mm (size of a pencil eraser)    Evolving: the size, shape, or color of the mole or growth is changing (evolving is not shown in the images below)    Checking for other types of skin cancer  Basal cell carcinoma or squamous cell carcinoma have symptoms such as:       A spot or mole that looks different from all other marks on your skin    Changes in how an area feels, such as itching, tenderness, or pain    Changes in the skin's surface, such as oozing, bleeding, or scaliness    A sore that does not heal    New swelling or redness beyond the border of a mole    Who s at risk?  Anyone can get skin cancer. But you are at greater risk if you have:     Fair skin, light-colored hair, or light-colored eyes    Many moles or abnormal moles on your skin    A history of sunburns from sunlight or tanning beds    A family history of skin cancer    A history of exposure to radiation or chemicals    A weakened immune system  If you have had skin cancer in the past, you are at risk for recurring skin cancer.   How to check your skin  Do your monthly skin checkups in front of a full-length mirror. Check all parts of your body, including your:     Head (ears, face, neck, and scalp)    Torso (front, back, and sides)    Arms (tops, undersides, upper, and lower  armpits)    Hands (palms, backs, and fingers, including under the nails)    Buttocks and genitals    Legs (front, back, and sides)    Feet (tops, soles, toes, including under the nails, and between toes)  If you have a lot of moles, take digital photos of them each month. Make sure to take photos both up close and from a distance. These can help you see if any moles change over time.   Most skin changes are not cancer. But if you see any changes in your skin, call your doctor right away. Only he or she can diagnose a problem. If you have skin cancer, seeing your doctor can be the first step toward getting the treatment that could save your life.   [employer] last reviewed this educational content on 4/1/2019 2000-2020 [employer]. [address] [place] [address]. All rights reserved. This information is not intended as a substitute for professional medical care. Always follow your healthcare professional's instructions.       When should I call my doctor?    If you are worsening or not improving, please, contact us or seek urgent care as noted below.     Who should I call with questions (adults)?    [facility] (adult and pediatric): [phone]    [facility] (adult): [phone]    For urgent needs outside of business hours call the [facility] at [phone] and ask for the dermatology resident on call to be paged    If this is a medical emergency and you are unable to reach an ER, Call [phone]    Who should I call with questions (pediatric)?  [facility]- Pediatric Dermatology  Dr. Chandrika Frey, Dr. Eugenia Baez, Dr. Windy Agee, Dona Ugarte, PA, Dr. Thelma Rucker, Dr. Ita Keenan & Dr. Jean-Paul Chandra  Non-urgent nurse triage line; [phone]- Mary Jane and Linda BARLOW Care Coordinatorashly Graf (/Complex ) [phone]    If you need a prescription refill,  please contact your pharmacy. Refills are approved or denied by our Physicians during normal business hours, Monday through Fridays  Per office policy, refills will not be granted if you have not been seen within the past year (or sooner depending on your child's condition)    Scheduling Information:  Pediatric Appointment Scheduling and Call Center [phone]  Radiology Scheduling- [phone]  Sedation Unit Scheduling- [phone]  [place] Scheduling- [facility] [phone]; Pediatric Dermatology [phone]  Main  Services: [phone]  Wolof: [phone]  Mosotho: [phone]  Hmong/Renzo/Kazakh: [phone]  Preadmission Nursing Department Fax Number: [phone] (Fax all pre-operative paperwork to this number)    For urgent matters arising during evenings, weekends, or holidays that cannot wait for normal business hours please call [phone] and ask for the dermatology resident on call to be paged.

## 2022-03-07 ENCOUNTER — OFFICE VISIT (OUTPATIENT)
Dept: UROLOGY | Facility: CLINIC | Age: 47
End: 2022-03-07
Attending: FAMILY MEDICINE
Payer: COMMERCIAL

## 2022-03-07 VITALS
SYSTOLIC BLOOD PRESSURE: 125 MMHG | DIASTOLIC BLOOD PRESSURE: 70 MMHG | WEIGHT: 200 LBS | HEIGHT: 72 IN | BODY MASS INDEX: 27.09 KG/M2

## 2022-03-07 DIAGNOSIS — Z30.09 VASECTOMY EVALUATION: Primary | ICD-10-CM

## 2022-03-07 PROCEDURE — 99203 OFFICE O/P NEW LOW 30 MIN: CPT | Performed by: UROLOGY

## 2022-03-07 ASSESSMENT — PAIN SCALES - GENERAL: PAINLEVEL: NO PAIN (0)

## 2022-03-07 NOTE — PATIENT INSTRUCTIONS
"[facility] UROLOGY  Vasectomy Information  [phone]    Preoperative Instructions:    _____ You may have breakfast on the morning of your procedure.  If your procedure is          in the afternoon, you may have lunch as well.    _____ You must have someone drive you home after the procedure if you have been    prescribed an oral sedative (valium).                   _____ Do not take any aspirin, blood-thinning or anti-inflammatory medication for at    least 7-10 days before the procedure (this includes but is not limited to baby aspirin, aspirin,  Ibuprofen (Advil,Aleve, Motrin),Celebrex, Exedrin,Ecotrin and Bufferin).  If you have ANY  Questions about this, please call.    _____ Please shave your scrotum (see diagram) the morning of your procedure.  Use              Hibiclens (available at your local drugstore) antibacterial cleanser.        Postoperative Instructions Follow Vasectomy    Under routine circumstances, please note the following:    -No heavy lifting (over 15 lbs) for 48 hour.  -You may shower after 24 hours.  You may have a tub bath or use a swimming pool after one week postoperatively.  Your doctor will advise you if he feels it is helpful to soak in a bathtub postoperatively.  -Do not engage in intercourse for at least ten days and then proceed when comfortable.  -Wear an athletic supporter for 48 hours postoperatively or until any discomfort ceases.  -Your physician will instruct you regarding the use of ice in the recovery room or at home after the procedure, as necessary.  -Please remember as you resume your activity that you may experience some discomfor and/or swelling for the one to two weeks following the procedure.  If this occurs decrease activity and slightly elevate the scrotum (athletic supporter).  -As your stitches dissolve it may appear that the incision is \"gaping.\"  This is normal.    Necessary follow-up:  You do not need a follow up appointment unless you have problems after your " procedure.  Please call our office at [phone] if you do.    At the time of your procedure you will be given the supplies for your post procedure follow up.  The test should be done AT LEAST THREE MONTHS AFTER your vasecomy.  During this time, be certain to maintain birth control measure!    Between the time of your procedure and the time that you have your first sperm count it is very important that you have a minimum of 30 ejaculations.  If you have any questions about this, please consult your physician.    If the sperm count that is done at three months is negative, you will be considered sterile.  Until, you are told that you are free to discontinue birth control you are considered fertile.    If your sperm counts reveal the presence of sperm, you will be advised to repeat the test until a negative result is obtained.     NOTE:  Please call our office one week after dropping off your sample for the result.  Test results will only be given to the patient.

## 2022-03-07 NOTE — PROGRESS NOTES
VASECTOMY CONSULTATION NOTE  DATE OF VISIT: 3/7/2022  [facility]  PATIENT NAME: Sabas Broussard    YOB: 1975      REASON FOR CONSULTATION: MrAngelique Broussard is a 46 year old year old gentleman who is seen today requesting a vasectomy. He has 2 children - 15 and 10 year old boys - and he wishes to have a vasectomy for birth control. He is  and is with a girlfriend with two kids or her own    PAST MEDICAL HISTORY:   Past Medical History:   Diagnosis Date     Cholecystitis 2011     Concussion age 17     Fracture, clavicle age 17     House dust mite allergy     and mold allergy     Hypertension 2012    slightly higher than normal but not treated       PAST SURGICAL HISTORY:   Past Surgical History:   Procedure Laterality Date     C TOOTH ROOT REMOVAL      impacted molars     CHOLECYSTECTOMY       COLONOSCOPY N/A 10/15/2019    Procedure: COLONOSCOPY;  Surgeon: Srinivasan Zamora MD;  Location:  OR       MEDICATIONS:   Current Outpatient Medications:      loratadine (CLARITIN) 10 MG tablet, , Disp: , Rfl:     ALLERGIES:   Allergies   Allergen Reactions     Seasonal Allergies        FAMILY HISTORY:   Family History   Problem Relation Age of Onset     C.A.D. Maternal Grandmother      Cerebrovascular Disease Maternal Grandmother      Alcohol/Drug Maternal Grandmother      Gastrointestinal Disease Maternal Grandmother      C.A.D. Paternal Grandmother      Hypertension Paternal Grandmother      Hypertension Father      Lipids Father      Mental Illness Father         bipolar disorder     Alcohol/Drug Maternal Grandfather      Lipids Paternal Grandfather      Psychotic Disorder Brother      Mental Illness Brother         bipolar disorder     Melanoma No family hx of      Skin Cancer No family hx of        SOCIAL HISTORY:   Social History     Socioeconomic History     Marital status:      Spouse name: Not on file     Number of children: Not on file     Years of education: Not on file     Highest  "education level: Not on file   Occupational History     Not on file   Tobacco Use     Smoking status: Never Smoker     Smokeless tobacco: Never Used   Substance and Sexual Activity     Alcohol use: Yes     Alcohol/week: 0.0 standard drinks     Comment: socially     Drug use: No     Sexual activity: Yes     Partners: Female     Birth control/protection: I.U.D.   Other Topics Concern     Parent/sibling w/ CABG, MI or angioplasty before 65F 55M? No   Social History Narrative     Not on file     Social Determinants of Health     Financial Resource Strain: Not on file   Food Insecurity: Not on file   Transportation Needs: Not on file   Physical Activity: Not on file   Stress: Not on file   Social Connections: Not on file   Intimate Partner Violence: Not on file   Housing Stability: Not on file       HEIGHT: 6' 0\"     WEIGHT: 200 lbs 0 oz   BP: 125/70    PULSE: Data Unavailable    EXAM: He is alert and oriented and well-appearing.  Examination of the scrotum reveals normal scrotal skin.  The testicles are normal to palpation bilaterally with no intratesticular lesions.  He has normally palpable vasa bilaterally.    DIAGNOSIS: Request for sterilization    PLAN: The risks of the procedure as well as expectations for recovery and outcomes were explained in detail to him.  He was counseled on the risks for bleeding infection and pain after the procedure. We discussed the risk of post-vasectomy pain syndrome.  He was instructed to continue to use contraception until he had proven azoospermia on a semen specimen.  This would normally be collected at least 3 months after the procedure. Also discussed the rare, but possible risk of re-canalization of the vas, even after successful vasectomy with sterile semen specimen.  He was instructed to hold all anticoagulants medications for one week prior to the procedure.  It was recommended that he have someone else drive him home after his vasectomy.  In light of these risks and " expectations he would like to proceed.  We are scheduling a vasectomy in the office in the near future.    Pt. Understands:  -1/1000-1/3000 risk of future pregnancy even with perfectly done vasectomy  -vasectomy is a permanent procedure    -he may cryopreserve sperm if he wishes   -1-5% risk of post-vasectomy pain syndrome   -1-5% risk of complication, primarily infection or bleeding  - he needs to have a semen sample that shows no sperm before getting approval for unprotected intercourse.      Thank you for the kind consultation.    Time spent: 15 minutes spent on the date of the encounter doing chart review, history and exam, documentation and further activities as noted above.     Pardeep Wilks MD   Urology  [facility]  Clinic Phone [phone]

## 2022-03-07 NOTE — LETTER
3/7/2022       RE: Sabas Broussard  [address]  [facility] [address]     Dear Colleague,    Thank you for referring your patient, Sabas Broussard, to the [facility] UROLOGY CLINIC [place] at [facility]. Please see a copy of my visit note below.    VASECTOMY CONSULTATION NOTE  DATE OF VISIT: 3/7/2022  [facility]  PATIENT NAME: Sabas Broussard    YOB: 1975      REASON FOR CONSULTATION: Mr. Sabas Broussard is a 46 year old year old gentleman who is seen today requesting a vasectomy. He has 2 children - 15 and 10 year old boys - and he wishes to have a vasectomy for birth control. He is  and is with a girlfriend with two kids or her own    PAST MEDICAL HISTORY:   Past Medical History:   Diagnosis Date     Cholecystitis 2011     Concussion age 17     Fracture, clavicle age 17     House dust mite allergy     and mold allergy     Hypertension 2012    slightly higher than normal but not treated       PAST SURGICAL HISTORY:   Past Surgical History:   Procedure Laterality Date     C TOOTH ROOT REMOVAL      impacted molars     CHOLECYSTECTOMY       COLONOSCOPY N/A 10/15/2019    Procedure: COLONOSCOPY;  Surgeon: Srinivasan Zamora MD;  Location:  OR       MEDICATIONS:   Current Outpatient Medications:      loratadine (CLARITIN) 10 MG tablet, , Disp: , Rfl:     ALLERGIES:   Allergies   Allergen Reactions     Seasonal Allergies        FAMILY HISTORY:   Family History   Problem Relation Age of Onset     C.A.D. Maternal Grandmother      Cerebrovascular Disease Maternal Grandmother      Alcohol/Drug Maternal Grandmother      Gastrointestinal Disease Maternal Grandmother      C.A.D. Paternal Grandmother      Hypertension Paternal Grandmother      Hypertension Father      Lipids Father      Mental Illness Father         bipolar disorder     Alcohol/Drug Maternal Grandfather      Lipids Paternal Grandfather      Psychotic Disorder Brother      Mental  "Illness Brother         bipolar disorder     Melanoma No family hx of      Skin Cancer No family hx of        SOCIAL HISTORY:   Social History     Socioeconomic History     Marital status:      Spouse name: Not on file     Number of children: Not on file     Years of education: Not on file     Highest education level: Not on file   Occupational History     Not on file   Tobacco Use     Smoking status: Never Smoker     Smokeless tobacco: Never Used   Substance and Sexual Activity     Alcohol use: Yes     Alcohol/week: 0.0 standard drinks     Comment: socially     Drug use: No     Sexual activity: Yes     Partners: Female     Birth control/protection: I.U.D.   Other Topics Concern     Parent/sibling w/ CABG, MI or angioplasty before 65F 55M? No   Social History Narrative     Not on file     Social Determinants of Health     Financial Resource Strain: Not on file   Food Insecurity: Not on file   Transportation Needs: Not on file   Physical Activity: Not on file   Stress: Not on file   Social Connections: Not on file   Intimate Partner Violence: Not on file   Housing Stability: Not on file       HEIGHT: 6' 0\"     WEIGHT: 200 lbs 0 oz   BP: 125/70    PULSE: Data Unavailable    EXAM: He is alert and oriented and well-appearing.  Examination of the scrotum reveals normal scrotal skin.  The testicles are normal to palpation bilaterally with no intratesticular lesions.  He has normally palpable vasa bilaterally.    DIAGNOSIS: Request for sterilization    PLAN: The risks of the procedure as well as expectations for recovery and outcomes were explained in detail to him.  He was counseled on the risks for bleeding infection and pain after the procedure. We discussed the risk of post-vasectomy pain syndrome.  He was instructed to continue to use contraception until he had proven azoospermia on a semen specimen.  This would normally be collected at least 3 months after the procedure. Also discussed the rare, but possible " risk of re-canalization of the vas, even after successful vasectomy with sterile semen specimen.  He was instructed to hold all anticoagulants medications for one week prior to the procedure.  It was recommended that he have someone else drive him home after his vasectomy.  In light of these risks and expectations he would like to proceed.  We are scheduling a vasectomy in the office in the near future.    Pt. Understands:  -1/1000-1/3000 risk of future pregnancy even with perfectly done vasectomy  -vasectomy is a permanent procedure    -he may cryopreserve sperm if he wishes   -1-5% risk of post-vasectomy pain syndrome   -1-5% risk of complication, primarily infection or bleeding  - he needs to have a semen sample that shows no sperm before getting approval for unprotected intercourse.      Thank you for the kind consultation.    Time spent: 15 minutes spent on the date of the encounter doing chart review, history and exam, documentation and further activities as noted above.     Pardeep Wilks MD   Urology  [facility]  Clinic Phone [phone]

## 2022-05-02 ENCOUNTER — OFFICE VISIT (OUTPATIENT)
Dept: UROLOGY | Facility: CLINIC | Age: 47
End: 2022-05-02
Payer: COMMERCIAL

## 2022-05-02 VITALS
BODY MASS INDEX: 26.41 KG/M2 | DIASTOLIC BLOOD PRESSURE: 70 MMHG | HEIGHT: 72 IN | SYSTOLIC BLOOD PRESSURE: 130 MMHG | WEIGHT: 195 LBS

## 2022-05-02 DIAGNOSIS — Z30.2 ENCOUNTER FOR STERILIZATION: Primary | ICD-10-CM

## 2022-05-02 PROCEDURE — 88302 TISSUE EXAM BY PATHOLOGIST: CPT | Performed by: PATHOLOGY

## 2022-05-02 PROCEDURE — 55250 REMOVAL OF SPERM DUCT(S): CPT | Performed by: UROLOGY

## 2022-05-02 ASSESSMENT — PAIN SCALES - GENERAL: PAINLEVEL: NO PAIN (0)

## 2022-05-02 NOTE — NURSING NOTE
Chief Complaint   Patient presents with     Sterilization     Here for a in office vasectomy      Patient has signed the consent form stating that we will be doing a bilateral vasectomy today and that this is the correct procedure.  I verbally confirmed the patient's identity using two indicators, relevant allergies, and that the correct equipment was available. Patient was cleaned and prepped according to the appropriate policy.  Equipment was prepped in a sterile fashion and MD was informed that patient was ready.    Consent read and signed: Yes  Aspirin/blood thinning products stopped 7 days prior to procedure: Yes  Allergies   Allergen Reactions     Seasonal Allergies        Physician performed procedure.  After the procedure the patient was instructed to wait approximately three months and at least 30 ejaculations prior to returning a sample to confirm sterility.  The patient was instructed to bring in sample within 3-6 hours post sample ejaculation.  Any additional medications after the procedure were sent to the patient's pharmacy and instructions were given according to company protocol and the performing physician.  The physician performing the procedure prescribed as they felt appropriate.  Patient was also instructed to avoid heavy lifting or strenuous activity for 10-14 days and to ice the scrotum.  Samples from the R and L vas deferens were sent to the lab and an order was placed for future semen analysis.      The following medication was given:     MEDICATION:  Lidocaine 1% Soln  ROUTE: Local Infilitration  SITE: Scrotum   DOSE: 40ml   LOT #: [id number]  : [employer]  EXPIRATION DATE: 10/25  NDC#: 54051-103-43  Was there drug waste? Yes  Amount of drug waste (mL): 15ml.  Reason for waste:  As per MD  Multi-dose vial: Yes    Sharee Woodall  May 2, 2022

## 2022-05-02 NOTE — PATIENT INSTRUCTIONS
POST VASECTOMY INSTRUCTIONS    1.) If you have any concerns or questions, please contact our office at [phone].     2.) It is okay to take a shower, however, do not soak in water (bath,swimming, hot tub,etc....) until your incision is healed.    3.) You might notice some swelling, mild bruising, and discomfort for several days after your vasectomy. This is to be expected. For at least the next 24 hours, an ice pack should be applied for 20 minutes every hour that you are awake. Ice will help with discomfort and swelling. Do not place directly on the skin.    4.) No intercourse, strenuous activity or exercise for at least 7-10 days, even if you feel fine.    5.) You need to wear good scrotal support while you are healing. We strongly recommend an athletic supporter or a pair of regular briefs that are one size too small. Boxer briefs do not offer enough support.    6.) Tylenol as directed on the bottle is preferred for discomfort. Please avoid any blood thinning products such as ibuprofen and aspirin (Motrin, Advil, Excedrin, Aleve, ect..) for at least the next week.    7.) It is normal to have mild drainage from the incision area for several days. However, please contact our office if you notice: bright red blood that does not stop after three days, increased pain, heat at the incision, red streaks, foul smelling discharge, or if you start to run a fever.     8.) YOU MUST CONTINUE BIRTH CONTROL UNTIL WE CONFIRM YOUR STERILITY.  This process can take up to a year to complete (rare occurrence).     9.) You have been given a form with specimen cup and instructions for your follow up specimen. You will be cleared once we receive ONE negative specimen. If your specimen comes back positive (sperm seen) you will be asked to repeat the test. This does not mean that your vasectomy has failed.

## 2022-05-02 NOTE — LETTER
5/2/2022       RE: Sabas Broussard  [address]  [facility] [address]     Dear Colleague,    Thank you for referring your patient, Sabas Broussard, to the [facility] [place] at [facility]. Please see a copy of my visit note below.    OFFICE VASECTOMY OPERATIVE NOTE  JG     DATE: 05/02/22  PATIENT: Sabas Broussard    YOB: 1975    Sabas Broussard is a 46 year old male.  He has 2 children and he wishes a vasectomy for birth control.  He has read the brochure and he has shaved himself.  I reviewed the vasectomy procedure with him explaining that it would be done with a local anesthetic given just in the location where the vasectomy would be done.  It would be done through incisions with the removal of segments of the vasa, cauterization of the ends, and burying the ends separate with sutures.      Pt. Understands:  -1/1000-1/3000 risk of future pregnancy even with perfectly done vasectomy  -vasectomy is a permanent procedure    -he may cryopreserve sperm if he wishes   -1-5% risk of post-vasectomy pain syndrome   -1-5% risk of complication, primarily infection or bleeding  - he needs to have a semen sample that shows no sperm before getting approval for unprotected intercourse.      Complications such as bleeding, infection, and damage to other tissues in the area were discussed. I recommended that an ice bag be placed on the scrotum off and on tonight to help reduce pain and swelling.      He was reminded that he was not sterile immediately after the vasectomy that it would take at least 20 ejaculations to empty the vas of any remaining sperm.  He was not to provide a semen sample until after the 20th ejaculation and not before 12 weeks after the vas. He was  to fulfill both of those requirements.   He understands it is his responsibility to find out the results of the vas before proceeding with intercourse without birth  control protection.  Other items discussed were activity afterwards, returning to work, voluntary physical activity,  resuming sexual activity, clothing to wear, bathing, and care of the vas site and expected changes in the site as healing progresses.  After signing the permit, bilateral vasectomy was done as described below.     ANESTHESIA: Local    DETAILS OF PROCEDURE: The risks of the procedure were explained in detail to the patient and informed consent was obtained. The patient was placed supine on the procedure table and the penis and scrotum were prepped and draped in the standard sterile fashion. The right vas deferens was isolated and brought up to the skin. 1% lidocaine local anesthesia was used to infiltrate the skin and the spermatic cord. A small incision was created and adventitial tissues were swept away from the vas. A 1 cm segment of the vas was excised and sent for pathology. The proximal and distal lumina of the vas were cauterized and then each segment was tied off in a knuckling-fashion with a 3-0 vicryl suture. Hemostasis was ensured and the segments were released back into the scrotum. Meticulous hemostasis was achieved. The skin was closed with 3-0 chromic suture in a horizontal mattress fashion. Next the left vas was brought to the skin and a vasectomy was performed in the similar fashion. The skin was closed with 3-0 chromic suture in a horizontal mattress fashion.    COMPLICATIONS: None    TAKE HOME MEDICATIONS: Tylenol every 6 hours, PRN    DISMISSAL INSTRUCTIONS:  - Ice pack to scrotum 15 to 20 minutes each hour awake for 36 to 40 hours.  - No strenuous activity or ejaculation for at least 7 days.  - No unprotected sexual activity until proven azoospermia on semen samples at 3 months.  - Referred to patient handout for normal postop expectations and indications to contact nurse or physician.    M.D.: Pardeep Wilks MD

## 2022-05-02 NOTE — PROGRESS NOTES
OFFICE VASECTOMY OPERATIVE NOTE  [facility]     DATE: 05/02/22  PATIENT: Sabas Broussard    YOB: 1975    Sabas Broussard is a 46 year old male.  He has 2 children and he wishes a vasectomy for birth control.  He has read the brochure and he has shaved himself.  I reviewed the vasectomy procedure with him explaining that it would be done with a local anesthetic given just in the location where the vasectomy would be done.  It would be done through incisions with the removal of segments of the vasa, cauterization of the ends, and burying the ends separate with sutures.      Pt. Understands:  -1/1000-1/3000 risk of future pregnancy even with perfectly done vasectomy  -vasectomy is a permanent procedure    -he may cryopreserve sperm if he wishes   -1-5% risk of post-vasectomy pain syndrome   -1-5% risk of complication, primarily infection or bleeding  - he needs to have a semen sample that shows no sperm before getting approval for unprotected intercourse.      Complications such as bleeding, infection, and damage to other tissues in the area were discussed. I recommended that an ice bag be placed on the scrotum off and on tonight to help reduce pain and swelling.      He was reminded that he was not sterile immediately after the vasectomy that it would take at least 20 ejaculations to empty the vas of any remaining sperm.  He was not to provide a semen sample until after the 20th ejaculation and not before 12 weeks after the vas. He was  to fulfill both of those requirements.   He understands it is his responsibility to find out the results of the vas before proceeding with intercourse without birth control protection.  Other items discussed were activity afterwards, returning to work, voluntary physical activity,  resuming sexual activity, clothing to wear, bathing, and care of the vas site and expected changes in the site as healing progresses.  After signing the permit, bilateral vasectomy was done as  described below.     ANESTHESIA: Local    DETAILS OF PROCEDURE: The risks of the procedure were explained in detail to the patient and informed consent was obtained. The patient was placed supine on the procedure table and the penis and scrotum were prepped and draped in the standard sterile fashion. The right vas deferens was isolated and brought up to the skin. 1% lidocaine local anesthesia was used to infiltrate the skin and the spermatic cord. A small incision was created and adventitial tissues were swept away from the vas. A 1 cm segment of the vas was excised and sent for pathology. The proximal and distal lumina of the vas were cauterized and then each segment was tied off in a knuckling-fashion with a 3-0 vicryl suture. Hemostasis was ensured and the segments were released back into the scrotum. Meticulous hemostasis was achieved. The skin was closed with 3-0 chromic suture in a horizontal mattress fashion. Next the left vas was brought to the skin and a vasectomy was performed in the similar fashion. The skin was closed with 3-0 chromic suture in a horizontal mattress fashion.    COMPLICATIONS: None    TAKE HOME MEDICATIONS: Tylenol every 6 hours, PRN    DISMISSAL INSTRUCTIONS:  - Ice pack to scrotum 15 to 20 minutes each hour awake for 36 to 40 hours.  - No strenuous activity or ejaculation for at least 7 days.  - No unprotected sexual activity until proven azoospermia on semen samples at 3 months.  - Referred to patient handout for normal postop expectations and indications to contact nurse or physician.    M.D.: Pardeep Wilks MD

## 2022-05-03 LAB
PATH REPORT.COMMENTS IMP SPEC: NORMAL
PATH REPORT.COMMENTS IMP SPEC: NORMAL
PATH REPORT.FINAL DX SPEC: NORMAL
PATH REPORT.GROSS SPEC: NORMAL
PATH REPORT.MICROSCOPIC SPEC OTHER STN: NORMAL
PATH REPORT.RELEVANT HX SPEC: NORMAL
PHOTO IMAGE: NORMAL

## 2022-09-03 ENCOUNTER — HEALTH MAINTENANCE LETTER (OUTPATIENT)
Age: 47
End: 2022-09-03

## 2023-01-15 ENCOUNTER — HEALTH MAINTENANCE LETTER (OUTPATIENT)
Age: 48
End: 2023-01-15

## 2023-03-09 ENCOUNTER — TELEPHONE (OUTPATIENT)
Dept: DERMATOLOGY | Facility: CLINIC | Age: 48
End: 2023-03-09
Payer: COMMERCIAL

## 2023-03-09 NOTE — TELEPHONE ENCOUNTER
Lvm. 1st attempt informing patient to call [phone] to schedule 2 year follow up with Dona Ugarte in Dermatology.

## 2023-07-12 ENCOUNTER — OFFICE VISIT (OUTPATIENT)
Dept: FAMILY MEDICINE | Facility: CLINIC | Age: 48
End: 2023-07-12
Payer: COMMERCIAL

## 2023-07-12 VITALS
TEMPERATURE: 97.1 F | BODY MASS INDEX: 28.31 KG/M2 | OXYGEN SATURATION: 95 % | DIASTOLIC BLOOD PRESSURE: 89 MMHG | HEART RATE: 64 BPM | SYSTOLIC BLOOD PRESSURE: 134 MMHG | WEIGHT: 209 LBS | HEIGHT: 72 IN | RESPIRATION RATE: 16 BRPM

## 2023-07-12 DIAGNOSIS — Z00.00 ROUTINE GENERAL MEDICAL EXAMINATION AT A HEALTH CARE FACILITY: Primary | ICD-10-CM

## 2023-07-12 PROBLEM — R19.5 DECREASED STOOL CALIBER: Status: RESOLVED | Noted: 2018-03-09 | Resolved: 2023-07-12

## 2023-07-12 LAB
CHOLEST SERPL-MCNC: 235 MG/DL
HBV SURFACE AB SERPL IA-ACNC: 0 M[IU]/ML
HBV SURFACE AB SERPL IA-ACNC: NONREACTIVE M[IU]/ML
HDLC SERPL-MCNC: 47 MG/DL
LDLC SERPL CALC-MCNC: 148 MG/DL
NONHDLC SERPL-MCNC: 188 MG/DL
TRIGL SERPL-MCNC: 201 MG/DL

## 2023-07-12 PROCEDURE — 90471 IMMUNIZATION ADMIN: CPT | Performed by: FAMILY MEDICINE

## 2023-07-12 PROCEDURE — 0124A COVID-19 BIVALENT 12+ (PFIZER): CPT | Performed by: FAMILY MEDICINE

## 2023-07-12 PROCEDURE — 36415 COLL VENOUS BLD VENIPUNCTURE: CPT | Performed by: FAMILY MEDICINE

## 2023-07-12 PROCEDURE — 90715 TDAP VACCINE 7 YRS/> IM: CPT | Performed by: FAMILY MEDICINE

## 2023-07-12 PROCEDURE — 86706 HEP B SURFACE ANTIBODY: CPT | Performed by: FAMILY MEDICINE

## 2023-07-12 PROCEDURE — 80061 LIPID PANEL: CPT | Performed by: FAMILY MEDICINE

## 2023-07-12 PROCEDURE — 91312 COVID-19 BIVALENT 12+ (PFIZER): CPT | Performed by: FAMILY MEDICINE

## 2023-07-12 PROCEDURE — 99396 PREV VISIT EST AGE 40-64: CPT | Mod: 25 | Performed by: FAMILY MEDICINE

## 2023-07-12 ASSESSMENT — PAIN SCALES - GENERAL: PAINLEVEL: NO PAIN (0)

## 2023-07-12 NOTE — PATIENT INSTRUCTIONS
Call for derm appointment: [facility] (adult and pediatric): [phone]  [facility] (adult): [phone]    Preventive Health Recommendations  Male Ages 40 to 49    Yearly exam:             See your health care provider every year in order to  o   Review health changes.   o   Discuss preventive care.    o   Review your medicines if your doctor has prescribed any.  You should be tested each year for STDs (sexually transmitted diseases) if you re at risk.   Have a cholesterol test every 5 years.   Have a colonoscopy (test for colon cancer) if someone in your family has had colon cancer or polyps before age 50.   After age 45, have a diabetes test (fasting glucose). If you are at risk for diabetes, you should have this test every 3 years.    Talk with your health care provider about whether or not a prostate cancer screening test (PSA) is right for you.    Shots: Get a flu shot each year. Get a tetanus shot every 10 years.     Nutrition:  Eat at least 5 servings of fruits and vegetables daily.   Eat whole-grain bread, whole-wheat pasta and brown rice instead of white grains and rice.   Get adequate Calcium and Vitamin D.     Lifestyle  Exercise for at least 150 minutes a week (30 minutes a day, 5 days a week). This will help you control your weight and prevent disease.   Limit alcohol to one drink per day.   No smoking.   Wear sunscreen to prevent skin cancer.   See your dentist every six months for an exam and cleaning.

## 2023-07-12 NOTE — NURSING NOTE
Prior to immunization administration, verified patients identity using patient s name and date of birth. Please see Immunization Activity for additional information.     Screening Questionnaire for Adult Immunization    Are you sick today?   No   Do you have allergies to medications, food, a vaccine component or latex?   No   Have you ever had a serious reaction after receiving a vaccination?   No   Do you have a long-term health problem with heart, lung, kidney, or metabolic disease (e.g., diabetes), asthma, a blood disorder, no spleen, complement component deficiency, a cochlear implant, or a spinal fluid leak?  Are you on long-term aspirin therapy?   No   Do you have cancer, leukemia, HIV/AIDS, or any other immune system problem?   No   Do you have a parent, brother, or sister with an immune system problem?   No   In the past 3 months, have you taken medications that affect  your immune system, such as prednisone, other steroids, or anticancer drugs; drugs for the treatment of rheumatoid arthritis, Crohn s disease, or psoriasis; or have you had radiation treatments?   No   Have you had a seizure, or a brain or other nervous system problem?   No   During the past year, have you received a transfusion of blood or blood    products, or been given immune (gamma) globulin or antiviral drug?   No   For women: Are you pregnant or is there a chance you could become       pregnant during the next month?   No   Have you received any vaccinations in the past 4 weeks?   No     Immunization questionnaire answers were all negative.      Patient instructed to remain in clinic for 15 minutes afterwards, and to report any adverse reactions.     Screening performed by Farhana Walter MA on 7/12/2023 at 10:15 AM.

## 2023-07-12 NOTE — PROGRESS NOTES
SUBJECTIVE:   CC: Sabas is an 48 year old who presents for preventative health visit.       7/12/2023     9:30 AM   Additional Questions   Roomed by Angelina ARRIAGA     Healthy Habits:     Getting at least 3 servings of Calcium per day:  Yes    Bi-annual eye exam:  Yes    Dental care twice a year:  Yes    Sleep apnea or symptoms of sleep apnea:  None    Diet:  Regular (no restrictions)    Frequency of exercise:  4-5 days/week    Duration of exercise:  45-60 minutes    Taking medications regularly:  Yes    Medication side effects:  None    Additional concerns today:  Yes    Also:  Mole on back.  Went and talked to dermatologist last year.  She said could get removed whenever wanted.  Over fourth - swimming in lake and fish chomped off.      Today's PHQ-2 Score:       7/12/2023     9:24 AM   PHQ-2 ( 1999 [employer])   Q1: Little interest or pleasure in doing things 0   Q2: Feeling down, depressed or hopeless 0   PHQ-2 Score 0   Q1: Little interest or pleasure in doing things Not at all   Q2: Feeling down, depressed or hopeless Not at all   PHQ-2 Score 0             Social History     Tobacco Use     Smoking status: Never     Smokeless tobacco: Never   Substance Use Topics     Alcohol use: Yes     Alcohol/week: 0.0 standard drinks of alcohol     Comment: socially             7/12/2023     9:24 AM   Alcohol Use   Prescreen: >3 drinks/day or >7 drinks/week? No          No data to display                Last PSA: No results found for: PSA    Reviewed orders with patient. Reviewed health maintenance and updated orders accordingly - Yes      Reviewed and updated as needed this visit by clinical staff   Tobacco  Allergies  Meds  Problems             Reviewed and updated as needed this visit by Provider      Problems                Review of Systems  CONSTITUTIONAL: NEGATIVE for fever, chills, change in weight  INTEGUMENTARY/SKIN: NEGATIVE for worrisome rashes, moles or lesions  EYES: NEGATIVE for vision changes or  irritation  ENT: NEGATIVE for ear, mouth and throat problems  RESP: NEGATIVE for significant cough or SOB  CV: NEGATIVE for chest pain, palpitations or peripheral edema  GI: NEGATIVE for nausea, abdominal pain, heartburn, or change in bowel habits   male: negative for dysuria, hematuria, decreased urinary stream, erectile dysfunction, urethral discharge  MUSCULOSKELETAL: NEGATIVE for significant arthralgias or myalgia  NEURO: NEGATIVE for weakness, dizziness or paresthesias  PSYCHIATRIC: NEGATIVE for changes in mood or affect    OBJECTIVE:   /89   Pulse 64   Temp 97.1  F (36.2  C) (Temporal)   Resp 16   Ht 1.829 m (6')   Wt 94.8 kg (209 lb)   SpO2 95%   BMI 28.35 kg/m      Physical Exam  GENERAL: healthy, alert and no distress  EYES: Eyes grossly normal to inspection, PERRL and conjunctivae and sclerae normal  HENT: ear canals and TM's normal, nose and mouth without ulcers or lesions  NECK: no adenopathy, no asymmetry, masses, or scars and thyroid normal to palpation  RESP: lungs clear to auscultation - no rales, rhonchi or wheezes  CV: regular rate and rhythm, normal S1 S2, no S3 or S4, no murmur, click or rub, no peripheral edema and peripheral pulses strong  ABDOMEN: soft, nontender, no hepatosplenomegaly, no masses and bowel sounds normal  MS: no gross musculoskeletal defects noted, no edema  SKIN: no suspicious lesions or rashes  NEURO: Normal strength and tone, mentation intact and speech normal  PSYCH: mentation appears normal, affect normal/bright    Diagnostic Test Results:  Labs reviewed in Epic    ASSESSMENT/PLAN:   Sabas was seen today for physical.    Diagnoses and all orders for this visit:    Routine general medical examination at a health care facility  Comments:  Check lipids and hep B antibody today  Discussed exercise and stress management  Congratulations on upcoming marriage!  See Derm for mole removal  Orders:  -     Hepatitis B Surface Antibody; Future  -     Lipid panel  reflex to direct LDL Non-fasting; Future    Other orders  -     TDAP VACCINE (Adacel, Boostrix)  -     REVIEW OF HEALTH MAINTENANCE PROTOCOL ORDERS  -     COVID-19 BIVALENT 12+ (PFIZER)  -     PRIMARY CARE FOLLOW-UP SCHEDULING; Future              COUNSELING:   Reviewed preventive health counseling, as reflected in patient instructions      BMI:   Estimated body mass index is 28.35 kg/m  as calculated from the following:    Height as of this encounter: 1.829 m (6').    Weight as of this encounter: 94.8 kg (209 lb).   Weight management plan: Discussed healthy diet and exercise guidelines      He reports that he has never smoked. He has never used smokeless tobacco.        Rayne Sosa MD  [facility]

## 2023-10-24 ENCOUNTER — DOCUMENTATION ONLY (OUTPATIENT)
Dept: UROLOGY | Facility: CLINIC | Age: 48
End: 2023-10-24

## 2023-10-24 ENCOUNTER — LAB (OUTPATIENT)
Dept: LAB | Facility: CLINIC | Age: 48
End: 2023-10-24
Payer: COMMERCIAL

## 2023-10-24 DIAGNOSIS — Z30.2 ENCOUNTER FOR STERILIZATION: Primary | ICD-10-CM

## 2023-10-24 DIAGNOSIS — Z30.2 ENCOUNTER FOR STERILIZATION: ICD-10-CM

## 2023-10-24 PROCEDURE — 89321 SEMEN ANAL SPERM DETECTION: CPT

## 2023-10-24 NOTE — PROGRESS NOTES
Sabas Broussard has an upcoming lab appointment:    Future Appointments   Date Time Provider Department Center   10/24/2023  2:15 PM CS LAB [facility]   3/11/2024  9:30 AM Dona Ugarte PA-C [facility]     Patient is scheduled for the following lab(s): pt is requesting post vas lab. Please order if necessary, thank you    There is no order available. Please review and place either future orders or HMPO (Review of Health Maintenance Protocol Orders), as appropriate.    There are no preventive care reminders to display for this patient.  Amanda York

## 2023-11-01 LAB — SEMEN ANALYSIS P VAS PNL: NORMAL

## 2024-03-11 ENCOUNTER — OFFICE VISIT (OUTPATIENT)
Dept: DERMATOLOGY | Facility: CLINIC | Age: 49
End: 2024-03-11
Payer: COMMERCIAL

## 2024-03-11 DIAGNOSIS — D49.2 NEOPLASM OF UNSPECIFIED BEHAVIOR OF BONE, SOFT TISSUE, AND SKIN: Primary | ICD-10-CM

## 2024-03-11 DIAGNOSIS — D22.9 MULTIPLE BENIGN NEVI: ICD-10-CM

## 2024-03-11 DIAGNOSIS — D18.01 CHERRY ANGIOMA: ICD-10-CM

## 2024-03-11 PROCEDURE — 88305 TISSUE EXAM BY PATHOLOGIST: CPT | Mod: 26 | Performed by: PATHOLOGY

## 2024-03-11 PROCEDURE — 88305 TISSUE EXAM BY PATHOLOGIST: CPT | Mod: TC | Performed by: PHYSICIAN ASSISTANT

## 2024-03-11 PROCEDURE — 11102 TANGNTL BX SKIN SINGLE LES: CPT | Performed by: PHYSICIAN ASSISTANT

## 2024-03-11 ASSESSMENT — PAIN SCALES - GENERAL: PAINLEVEL: NO PAIN (0)

## 2024-03-11 NOTE — LETTER
3/11/2024       RE: Sabas Broussard  [address]  [facility] [address]     Dear Colleague,    Thank you for referring your patient, Sabas Broussard, to the [facility] DERMATOLOGY [facility]INIC [place] at [facility]. Please see a copy of my visit note below.    [facility] Dermatology Note  Encounter Date: Mar 11, 2024  Office Visit     Dermatology Problem List:  1. Benign skin cancer screening 2/28/22  2. NUB x1  - s/p shave bx 03/11/2024    ____________________________________________    Assessment & Plan:     # NUB x1 central upper back - ddx traumatized nevus vs other  - shave biopsy performed today, see note below    Procedures Performed:   - Shave biopsy: After discussion of benefits and risks including but not limited to bleeding, infection, scar, incomplete removal, recurrence, and non-diagnostic biopsy, written consent and photographs were obtained. The area was cleaned with isopropyl alcohol. < 1mL of 1% lidocaine with epinephrine was injected to obtain adequate anesthesia. A shave biopsy was performed. Hemostasis was achieved with aluminium chloride. Vaseline and a sterile dressing were applied. The patient tolerated the procedure and no complications were noted. The patient was provided with verbal and written post care instructions.       Follow-up: prn for new or changing lesions    Staff and Scribe:   Scribe Disclosure:   By signing my name below, I, Roberta Kennedy, attest that this documentation has been prepared under the direction and in the presence of Dona Ugarte PA-C.  - Electronically Signed: Roberta Kennedy 03/11/24       Provider Disclosure:  I agree with above History, Review of Systems, Physical exam and Plan.  I have reviewed the content of the documentation and have edited it as needed. I have personally performed the services documented here and the documentation accurately represents those services and the  decisions I have made.      Electronically signed by:    All risk, benefits and alternatives were discussed with patient.  Patient is in agreement and understands the assessment and plan.  All questions were answered.  Sun Screen Education was given.   Return to Clinic  as needed.   Dona Ugarte PA-C      ____________________________________________    CC: Derm Problem (Spot of concern on the back)    HPI:  Mr. Sabas Broussard is a(n) 48 year old male who presents today as a return patient for a skin check. He was last seen by me 02/28/2022.    Patient reports the spot of concern [place] Denies fhx of skin cancers or skin diseases/disorders.    Patient is otherwise feeling well, without additional skin concerns.     Labs Reviewed:  N/A    Physical exam:  Vitals: There were no vitals taken for this visit.  GEN: This is a well developed, well-nourished male in no acute distress, in a pleasant mood.    SKIN: Focused examination of the waist up was performed.  - Central upper back 7 mm pink and brown papule with hx of trauma  - There are dome shaped bright red papules on the head/neck, trunk, extremities.   - Multiple regular brown pigmented macules and papules are identified on the head/neck, trunk, extremities.   - No other lesions of concern on areas examined.       Medications:  Current Outpatient Medications   Medication     loratadine (CLARITIN) 10 MG tablet     No current facility-administered medications for this visit.      Past Medical History:   Patient Active Problem List   Diagnosis     House dust mite allergy     Moderate mixed hyperlipidemia not requiring statin therapy     Past Medical History:   Diagnosis Date     Cholecystitis 2011     Concussion age 17     Decreased stool caliber 3/9/2018     Fracture, clavicle age 17     House dust mite allergy     and mold allergy     Hypertension 2012    slightly higher than normal but not treated       CC Rayne Sosa,  MD  [address] [place] [address] on close of this encounter.      Again, thank you for allowing me to participate in the care of your patient.      Sincerely,    Dona Ugarte PA-C

## 2024-03-11 NOTE — PROGRESS NOTES
[facility] Dermatology Note  Encounter Date: Mar 11, 2024  Office Visit     Dermatology Problem List:  1. Benign skin cancer screening 2/28/22  2. NUB x1  - s/p shave bx 03/11/2024    ____________________________________________    Assessment & Plan:     # NUB x1 central upper back - ddx traumatized nevus vs other  - shave biopsy performed today, see note below    Procedures Performed:   - Shave biopsy: After discussion of benefits and risks including but not limited to bleeding, infection, scar, incomplete removal, recurrence, and non-diagnostic biopsy, written consent and photographs were obtained. The area was cleaned with isopropyl alcohol. < 1mL of 1% lidocaine with epinephrine was injected to obtain adequate anesthesia. A shave biopsy was performed. Hemostasis was achieved with aluminium chloride. Vaseline and a sterile dressing were applied. The patient tolerated the procedure and no complications were noted. The patient was provided with verbal and written post care instructions.       Follow-up: prn for new or changing lesions    Staff and Scribe:   Scribe Disclosure:   By signing my name below, I, Roberta Azuldejan, attest that this documentation has been prepared under the direction and in the presence of Dona Ugarte PA-C.  - Electronically Signed: Roberta Kennedy 03/11/24       Provider Disclosure:  I agree with above History, Review of Systems, Physical exam and Plan.  I have reviewed the content of the documentation and have edited it as needed. I have personally performed the services documented here and the documentation accurately represents those services and the decisions I have made.      Electronically signed by:    All risk, benefits and alternatives were discussed with patient.  Patient is in agreement and understands the assessment and plan.  All questions were answered.  Sun Screen Education was given.   Return to Clinic  as needed.   Dona Ugarte PA-C       ____________________________________________    CC: Derm Problem (Spot of concern on the back)    HPI:  Mr. Sabas Broussard is a(n) 48 year old male who presents today as a return patient for a skin check. He was last seen by me 02/28/2022.    Patient reports [place] of concern [place] Denies fhx of skin cancers or skin diseases/disorders.    Patient is otherwise feeling well, without additional skin concerns.     Labs Reviewed:  N/A    Physical exam:  Vitals: There were no vitals taken for this visit.  GEN: This is a well developed, well-nourished male in no acute distress, in a pleasant mood.    SKIN: Focused examination of the waist up was performed.  - Central upper back 7 mm pink and brown papule with hx of trauma  - There are dome shaped bright red papules on the head/neck, trunk, extremities.   - Multiple regular brown pigmented macules and papules are identified on the head/neck, trunk, extremities.   - No other lesions of concern on areas examined.       Medications:  Current Outpatient Medications   Medication    loratadine (CLARITIN) 10 MG tablet     No current facility-administered medications for this visit.      Past Medical History:   Patient Active Problem List   Diagnosis    House dust mite allergy    Moderate mixed hyperlipidemia not requiring statin therapy     Past Medical History:   Diagnosis Date    Cholecystitis 2011    Concussion age 17    Decreased stool caliber 3/9/2018    Fracture, clavicle age 17    House dust mite allergy     and mold allergy    Hypertension 2012    slightly higher than normal but not treated       CC Rayne Sosa MD  [address] [place] [address] on close of this encounter.

## 2024-03-11 NOTE — NURSING NOTE
Dermatology Rooming Note    Sabas Broussard's goals for this visit include:   Chief Complaint   Patient presents with    Derm Problem     Spot of concern on the back, attacked by a fish last year     Kim Mendez, CMA

## 2024-03-11 NOTE — NURSING NOTE
Lidocaine-epinephrine 1-1:020517 % injection   0.5mL once for one use, starting 3/11/2024 ending 3/11/2024,  2mL disp, R-0, injection  Injected by Kim Mendez CMA

## 2024-03-11 NOTE — PATIENT INSTRUCTIONS
Wound Care After a Biopsy    What is a skin biopsy?  A skin biopsy allows the doctor to examine a very small piece of tissue under the microscope to determine the diagnosis and the best treatment for the skin condition. A local anesthetic (numbing medicine) is injected with a very small needle into the skin area to be tested. A small piece of skin is taken from the area. Sometimes a suture (stitch) is used.     What are the risks of a skin biopsy?  I will experience scar, bleeding, swelling, pain, crusting and redness. I may experience incomplete removal or recurrence. Risks of this procedure are excessive bleeding, bruising, infection, nerve damage, numbness, thick (hypertrophic or keloidal) scar and non-diagnostic biopsy.    How should I care for my wound for the first 24 hours?  Keep the wound dry and covered for 24 hours  If it bleeds, hold direct pressure on the area for 15 minutes. If bleeding does not stop, call us or go to the emergency room  Avoid strenuous exercise the first 1-2 days or as your doctor instructs you    How should I care for the wound after 24 hours?  After 24 hours, remove the bandage  You may bathe or shower as normal  If you had a scalp biopsy, you can shampoo as usual and can use shower water to clean the biopsy site daily  Clean the wound once a day with gentle soap and water  Do not scrub, be gentle  Apply white petroleum/Vaseline after cleaning the wound with a cotton swab or a clean finger, and keep the site covered with a Bandaid /bandage. Bandages are not necessary with a scalp biopsy  If you are unable to cover the site with a Bandaid /bandage, re-apply ointment 2-3 times a day to keep the site moist. Moisture will help with healing  Avoid strenuous activity for first 1-2 days  Avoid lakes, rivers, pools, and oceans until the stitches are removed or the site is healed    How do I clean my wound?  Wash hands thoroughly with soap or use hand  before all wound care  Clean  the wound with gentle soap and water  Apply white petroleum/Vaseline  to wound after it is clean  Replace the Bandaid /bandage to keep the wound covered for the first few days or as instructed by your doctor  If you had a scalp biopsy, warm shower water to the area on a daily basis should suffice    What should I use to clean my wound?   Cotton-tipped applicators (Qtips )  White petroleum jelly (Vaseline ). Use a clean new container and use Q-tips to apply.  Bandaids  as needed  Gentle soap     How should I care for my wound long term?  Do not get your wound dirty  Keep up with wound care for one week or until the area is healed.  A small scab will form and fall off by itself when the area is completely healed. The area will be red and will become pink in color as it heals. Sun protection is very important for how your scar will turn out. Sunscreen with an SPF 30 or greater is recommended once the area is healed.  You should have some soreness but it should be mild and slowly go away over several days. Talk to your doctor about using tylenol for pain,    When should I call my doctor?  If you have increased:   Pain or swelling  Pus or drainage (clear or slightly yellow drainage is ok)  Temperature over 100F  Spreading redness or warmth around wound    When will I hear about my results?  The biopsy results can take 2 weeks to come back.  Your results will automatically release to [employer] before your provider has even reviewed them.  The clinic will call you with the results, send you a [employer] message, or have you schedule a follow-up clinic or phone time to discuss the results.  Contact our clinics if you do not hear from us in 2 weeks.    Who should I call with questions?  [facility]: [phone]  [facility]: [phone]  For urgent needs outside of business hours call the [facility] at [phone] and ask for the dermatology resident on call

## 2024-03-12 LAB
PATH REPORT.COMMENTS IMP SPEC: NORMAL
PATH REPORT.COMMENTS IMP SPEC: NORMAL
PATH REPORT.FINAL DX SPEC: NORMAL
PATH REPORT.GROSS SPEC: NORMAL
PATH REPORT.MICROSCOPIC SPEC OTHER STN: NORMAL
PATH REPORT.RELEVANT HX SPEC: NORMAL

## 2024-04-04 ENCOUNTER — ANCILLARY PROCEDURE (OUTPATIENT)
Dept: GENERAL RADIOLOGY | Facility: CLINIC | Age: 49
End: 2024-04-04
Attending: FAMILY MEDICINE
Payer: COMMERCIAL

## 2024-04-04 ENCOUNTER — OFFICE VISIT (OUTPATIENT)
Dept: URGENT CARE | Facility: URGENT CARE | Age: 49
End: 2024-04-04
Payer: COMMERCIAL

## 2024-04-04 VITALS
SYSTOLIC BLOOD PRESSURE: 139 MMHG | DIASTOLIC BLOOD PRESSURE: 91 MMHG | HEART RATE: 73 BPM | TEMPERATURE: 98.3 F | OXYGEN SATURATION: 97 %

## 2024-04-04 DIAGNOSIS — R03.0 ELEVATED BP WITHOUT DIAGNOSIS OF HYPERTENSION: ICD-10-CM

## 2024-04-04 DIAGNOSIS — M79.671 RIGHT FOOT PAIN: ICD-10-CM

## 2024-04-04 DIAGNOSIS — M79.644 PAIN OF RIGHT THUMB: ICD-10-CM
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
## 2024-04-04 PROCEDURE — 73630 X-RAY EXAM OF FOOT: CPT | Mod: TC | Performed by: RADIOLOGY

## 2024-04-04 PROCEDURE — 73140 X-RAY EXAM OF FINGER(S): CPT | Mod: TC | Performed by: RADIOLOGY

## 2024-04-04 PROCEDURE — 99213 OFFICE O/P EST LOW 20 MIN: CPT | Performed by: FAMILY MEDICINE

## 2024-04-04 ASSESSMENT — PAIN SCALES - GENERAL: PAINLEVEL: MILD PAIN (2)
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
## 2024-04-17 ENCOUNTER — OFFICE VISIT (OUTPATIENT)
Dept: URGENT CARE | Facility: URGENT CARE | Age: 49
End: 2024-04-17
Payer: COMMERCIAL

## 2024-04-17 ENCOUNTER — ANCILLARY PROCEDURE (OUTPATIENT)
Dept: GENERAL RADIOLOGY | Facility: CLINIC | Age: 49
End: 2024-04-17
Payer: COMMERCIAL

## 2024-04-17 VITALS
BODY MASS INDEX: 28.75 KG/M2 | DIASTOLIC BLOOD PRESSURE: 93 MMHG | SYSTOLIC BLOOD PRESSURE: 149 MMHG | HEART RATE: 81 BPM | TEMPERATURE: 97.5 F | WEIGHT: 212 LBS | OXYGEN SATURATION: 97 %

## 2024-04-17 DIAGNOSIS — M79.671 RIGHT FOOT PAIN: ICD-10-CM

## 2024-04-17 DIAGNOSIS — S93.601A SPRAIN OF RIGHT FOOT, INITIAL ENCOUNTER: Primary | ICD-10-CM

## 2024-04-17 PROCEDURE — 73630 X-RAY EXAM OF FOOT: CPT | Mod: TC | Performed by: RADIOLOGY

## 2024-04-17 PROCEDURE — 99213 OFFICE O/P EST LOW 20 MIN: CPT
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
## 2024-04-17 NOTE — PATIENT INSTRUCTIONS
Right foot x-ray shows the following per the radiologist report:  No fracture. Mild degenerative changes at the calcaneocuboid joint. Normal alignment. Tiny Achilles heel spur.   Rest, ice and elevate the affected extremity for pain and swelling.  Can use Tylenol and/or ibuprofen as needed for pain.  Maximum dose of Tylenol is 4000mg in a 24 hour period of time.  Take ibuprofen with food to avoid stomach upset.  Wear the boot for pain and support.  Follow up with orthopedics for further evaluation and treatment.

## 2024-06-23 ENCOUNTER — HOSPITAL ENCOUNTER (EMERGENCY)
Facility: CLINIC | Age: 49
Discharge: HOME | End: 2024-06-23
Attending: STUDENT IN AN ORGANIZED HEALTH CARE EDUCATION/TRAINING PROGRAM | Admitting: STUDENT IN AN ORGANIZED HEALTH CARE EDUCATION/TRAINING PROGRAM
Payer: COMMERCIAL

## 2024-06-23 VITALS
DIASTOLIC BLOOD PRESSURE: 89 MMHG | OXYGEN SATURATION: 99 % | WEIGHT: 210 LBS | SYSTOLIC BLOOD PRESSURE: 135 MMHG | TEMPERATURE: 97.8 F | HEIGHT: 72 IN | HEART RATE: 66 BPM | RESPIRATION RATE: 20 BRPM | BODY MASS INDEX: 28.44 KG/M2

## 2024-06-23 DIAGNOSIS — S61.311A LACERATION OF LEFT INDEX FINGER WITHOUT FOREIGN BODY WITH DAMAGE TO NAIL, INITIAL ENCOUNTER: ICD-10-CM

## 2024-06-23 PROCEDURE — 99282 EMERGENCY DEPT VISIT SF MDM: CPT

## 2024-06-23 PROCEDURE — 12001 RPR S/N/AX/GEN/TRNK 2.5CM/<: CPT

## 2024-06-23 ASSESSMENT — COLUMBIA-SUICIDE SEVERITY RATING SCALE - C-SSRS
2. HAVE YOU ACTUALLY HAD ANY THOUGHTS OF KILLING YOURSELF IN THE PAST MONTH?: NO
6. HAVE YOU EVER DONE ANYTHING, STARTED TO DO ANYTHING, OR PREPARED TO DO ANYTHING TO END YOUR LIFE?: NO
1. IN THE PAST MONTH, HAVE YOU WISHED YOU WERE DEAD OR WISHED YOU COULD GO TO SLEEP AND NOT WAKE UP?: NO

## 2024-06-23 ASSESSMENT — ACTIVITIES OF DAILY LIVING (ADL)
ADLS_ACUITY_SCORE: 35
ADLS_ACUITY_SCORE: 35

## 2024-06-24 NOTE — ED PROVIDER NOTES
Emergency Department Note      History of Present Illness     Chief Complaint  Laceration    HPI  Sabas Broussard is a right-handed 48 year old male with history of hypertension and hyperlipidemia who presents to the ED for evaluation of a laceration. The patient reports that he was chopping herbs at 1800 this evening and he accidentally hit his left index finger. He notes that his pain is not too bad. His finger was actively bleeding and he used gauze to manage the bleed.    Independent Historian  None    Review of External Notes  Reviewed the [facility] and his Tdap was in 2023.  Past Medical History   Medical History and Problem List  Cholecystitis   Concussion   Hypertension   Biliary colic   Hyperlipidemia   Cholelithiasis     Medications  Metoprolol   Percocet    Surgical History   Past Surgical History:   Procedure Laterality Date    C TOOTH ROOT REMOVAL      impacted molars    CHOLECYSTECTOMY      COLONOSCOPY N/A 10/15/2019    Procedure: COLONOSCOPY;  Surgeon: Srinivasan Zamora MD;  Location: [facility] OR    VASECTOMY       Physical Exam   Patient Vitals for the past 24 hrs:   BP Temp Temp src Pulse Resp SpO2 Height Weight   06/23/24 2113 135/89 -- -- 66 -- 99 % -- --   06/23/24 1916 (!) 137/93 97.8  F (36.6  C) Oral 67 20 98 % 1.829 m (6') 95.3 kg (210 lb)     Physical Exam  General: Alert and cooperative with exam. Patient in no apparent distress. Normal mentation.  Head:  Scalp is NC/AT  Eyes:  EOM intact  ENT:  The external nose and ears are normal.   Neck:  Normal range of motion without rigidity.  CV:  Warm and well perfused  Resp:  Breathing comfortably on room air  MSK:  Full ROM of left index finger. No evidence of tendon injury. 0.5 cm laceration lateral to nail with slight involvement to distal nail of left index finger nail. Bleeding controlled. After distal nail fragment removal, superficial nailbed laceration present.   Skin:  Warm and dry, No rash or lesions noted.  Neuro:  Oriented x 3. No gross motor  deficits.    Diagnostics     None  ED Course    Medications Administered  Medications - No data to display    Procedures  Procedures     Laceration Repair      Procedure: Laceration Repair    Indication: Laceration    Consent: Verbal    Tetanus status reviewed    Location: Left L second (index) finger    Length: 0.5 cm    Preparation: Irrigation with Sterile Saline.    Anesthesia/Sedation: Bupivacaine - 0.5%      Treatment/Exploration: Wound explored, no foreign bodies found     Closure: The wound was closed with one layer. Skin/superficial layer was closed with 2 x 5-0 Nylon using Interrupted sutures.  and Tissue Adhesive.    Patient Status: The patient tolerated the procedure well: Yes. There were no complications.     Discussion of Management  None    Social Determinants of Health adding to complexity of care  None    ED Course  ED Course as of 06/23/24 2222   Sun Jun 23, 2024 1949 I obtained history and examined the patient as noted above.    2046 I performed the laceration repair as noted above.     Medical Decision Making / Diagnosis   CMS Diagnoses: None    [facility]     None    [facility]  Sabas Broussard is a 48 year old male presents for evaluation of a laceration to distal left index finger with nailbed involvement as sustained as noted in the HPI. After anesthesia and copious irrigation, the wound was carefully evaluated and explored. There was no foreign body identified. CMS is intact.  There is no evidence of muscular, tendon, bone, or nerve damage with this laceration.  Distal aspect of the nail slightly avulsed.  No fragment was removed and there is evidence of superficial nailbed laceration present in addition to skin laceration.  Laceration was closed as noted in the procedure note with sutures applied to skin, tissue adhesive to nailbed laceration. The patient tolerated the procedure well and there were no immediate complications.  Possible complications (infection, scarring) were reviewed with the patient.  Appropriate wound dressing was placed and daily cares were discussed. Tetanus is up-to-date. he will be discharged home. he was asked to follow up with primary care in 10-14 days for suture removal. Red flag symptoms, and reasons for return were discussed and understood. All questions were answered prior to discharge. The patient understands and agrees to this plan.      Disposition  The patient was discharged.     ICD-10 Codes:    ICD-10-CM    1. Laceration of left index finger without foreign body with damage to nail, initial encounter  S61.311A            Discharge Medications  Discharge Medication List as of 6/23/2024  9:08 PM        Kim HERMOSILLO, am serving as a scribe at 7:46 PM on 6/23/2024 to document services personally performed by Pia Yarbrough PA-C based on my observations and the provider's statements to me.        Pia Yarbrough PA-C  06/23/24 4705

## 2024-06-24 NOTE — ED TRIAGE NOTES
Patient cut his left 4th finger on a knife today     Triage Assessment (Adult)       Row Name 06/23/24 1915          Triage Assessment    Airway WDL WDL        Respiratory WDL    Respiratory WDL WDL        Skin Circulation/Temperature WDL    Skin Circulation/Temperature WDL WDL        Cardiac WDL    Cardiac WDL WDL        Peripheral/Neurovascular WDL    Peripheral Neurovascular WDL WDL        Cognitive/Neuro/Behavioral WDL    Cognitive/Neuro/Behavioral WDL WDL

## 2024-06-24 NOTE — DISCHARGE INSTRUCTIONS
Keep wound clean and dry.  Do not apply bacitracin or Neosporin or other antibiotic ointments to the wound as this will breakdown the glue too quickly.  Sutures are to be removed in 10 to 14 days.  Apply new bandage daily.  Okay to wash with soap and water.    If you develop signs or symptoms of infection including increased redness, swelling, drainage to the wound, return to ER.

## 2024-09-14 ENCOUNTER — HEALTH MAINTENANCE LETTER (OUTPATIENT)
Age: 49
End: 2024-09-14

## (undated) DEVICE — GOWN IMPERVIOUS 2XL BLUE

## (undated) DEVICE — TUBING SUCTION 12"X1/4" N612

## (undated) DEVICE — KIT ENDO TURNOVER/PROCEDURE CARRY-ON 101822

## (undated) DEVICE — SOL WATER IRRIG 1000ML BOTTLE 2F7114

## (undated) DEVICE — SUCTION MANIFOLD NEPTUNE 2 SYS 1 PORT 702-025-000

## (undated) DEVICE — KIT ENDO FIRST STEP DISINFECTANT 200ML W/POUCH EP-4

## (undated) RX ORDER — ONDANSETRON 2 MG/ML
INJECTION INTRAMUSCULAR; INTRAVENOUS
Status: DISPENSED
Start: 2019-10-15

## (undated) RX ORDER — FENTANYL CITRATE 50 UG/ML
INJECTION, SOLUTION INTRAMUSCULAR; INTRAVENOUS
Status: DISPENSED
Start: 2019-10-15

## (undated) RX ORDER — DIPHENHYDRAMINE HYDROCHLORIDE 50 MG/ML
INJECTION INTRAMUSCULAR; INTRAVENOUS
Status: DISPENSED
Start: 2019-10-15